# Patient Record
Sex: FEMALE | Race: ASIAN | NOT HISPANIC OR LATINO | Employment: UNEMPLOYED | URBAN - METROPOLITAN AREA
[De-identification: names, ages, dates, MRNs, and addresses within clinical notes are randomized per-mention and may not be internally consistent; named-entity substitution may affect disease eponyms.]

---

## 2017-05-03 ENCOUNTER — OFFICE VISIT (OUTPATIENT)
Dept: FAMILY MEDICINE | Facility: CLINIC | Age: 72
End: 2017-05-03

## 2017-05-03 VITALS
OXYGEN SATURATION: 99 % | WEIGHT: 117 LBS | HEART RATE: 71 BPM | TEMPERATURE: 97.6 F | HEIGHT: 56 IN | SYSTOLIC BLOOD PRESSURE: 129 MMHG | BODY MASS INDEX: 26.32 KG/M2 | DIASTOLIC BLOOD PRESSURE: 84 MMHG

## 2017-05-03 DIAGNOSIS — N18.4 CKD (CHRONIC KIDNEY DISEASE) STAGE 4, GFR 15-29 ML/MIN (H): Primary | ICD-10-CM

## 2017-05-03 DIAGNOSIS — Z12.11 COLON CANCER SCREENING: ICD-10-CM

## 2017-05-03 DIAGNOSIS — M81.0 OSTEOPOROSIS: ICD-10-CM

## 2017-05-03 NOTE — NURSING NOTE
name: Edmund-Her  Language: Hmong  Agency: [employer]  Phone number: [phone]    PHQ9--given to pt to have  help fill out  Fit test--given to pt daughter with instructions of how to obtain specimen and returning it back to clinic

## 2017-05-03 NOTE — PATIENT INSTRUCTIONS
Only use tylenol for pain (ibuprofen, alleve, motrin can damage kidney).  Make an appointment at clinic when knee and ankle pain returns.  Come back to clinic in 2 months for recheck and to discuss Honoring Choices form.

## 2017-05-03 NOTE — MR AVS SNAPSHOT
"              After Visit Summary   5/3/2017    Ralph Marion    MRN: [id number]           Patient Information     Date Of Birth          1945        Visit Information        Provider Department      5/3/2017 2:40 PM Kortney Clemons MD [facility]        Today's Diagnoses     Screening    -  1      Care Instructions    Only use tylenol for pain (ibuprofen, alleve, motrin can damage kidney).  Make an appointment at clinic when knee and ankle pain returns.  Come back to clinic in 2 months for recheck and to discuss Honoring Choices form.        Follow-ups after your visit        Future tests that were ordered for you today     Open Future Orders        Priority Expected Expires Ordered    Fecal Occult Blood - FIT, iFOB ([facility]) Routine  5/10/2017 5/3/2017            Who to contact     Please call your clinic at [phone] to:    Ask questions about your health    Make or cancel appointments    Discuss your medicines    Learn about your test results    Speak to your doctor   If you have compliments or concerns about an experience at your clinic, or if you wish to file a complaint, please contact [facility] Patient Relations at [phone] or email us at [email or web address]         Additional Information About Your Visit        Care EveryWhere ID     This is your Care EveryWhere ID. This could be used by other organizations to access your [facility] medical records  [id number]        Your Vitals Were     Pulse Temperature Height Pulse Oximetry BMI (Body Mass Index)       71 97.6  F (36.4  C) (Oral) 4' 7.5\" (141 cm) 99% 26.71 kg/m2        Blood Pressure from Last 3 Encounters:   05/03/17 129/84   09/16/16 132/81   08/25/16 130/72    Weight from Last 3 Encounters:   05/03/17 117 lb (53.1 kg)   09/16/16 121 lb 6.4 oz (55.1 kg)   08/25/16 121 lb (54.9 kg)               Primary Care Provider Office Phone # Fax #    Kortney Clemons [phone] "       [facility] [address] [place] [id number]        Thank you!     Thank you for choosing [facility]  for your care. Our goal is always to provide you with excellent care. Hearing back from our patients is one way we can continue to improve our services. Please take a few minutes to complete the written survey that you may receive in the mail after your visit with us. Thank you!             Your Updated Medication List - Protect others around you: Learn how to safely use, store and throw away your medicines at www.disposemymeds.org.          This list is accurate as of: 5/3/17  4:00 PM.  Always use your most recent med list.                   Brand Name Dispense Instructions for use    Incontinence Brief Medium Misc     100 Bottle    Please use as needed for urinary incontinence.

## 2017-05-04 DIAGNOSIS — Z13.9 SCREENING: ICD-10-CM

## 2017-05-04 LAB — HEMOCCULT STL QL IA: NEGATIVE

## 2017-05-04 NOTE — PROGRESS NOTES
HPI:       Ralph Marion is a 72 year old female with a hx of osteoporosis, CKD stage 4 and depression who presents for follow up of concern(s) listed below    CKD stage 4:  -referred to nephrology who recommended kidney biopsy and eventual dialysis - pt refusing both of these  -pt is scheduled to see nephrology again on 5/16 - had labs done 5/2  -reports nephrology tried to start pt on lisinopril (likely for kidney protection) but pt developed cough so she stopped this  -denies chest pain, palpitations, SOB, confusion, LE edema, abd pain    Osteoporosis:  -hx osteoporosis on DEXA scan  -stopped taking vitamin D and calcium because it was making her feel ill  -not taking bisphosphonate - limited options given kidney disease; could use Forteo - nephrology was fine with using this but pt not wanting to take injectable medication  -no recent falls    Goals of care:  -daughter reports that pt has talked with family regarding her goals of care - does not want resuscitation, no dialysis (patient confirms this today)  -discussed that pt should complete paperwork regarding these wishes so healthcare professionals know patient's goals in case pt/family unable to communicate    Handicap parking form:  -pt here with handicap parking form today because of intermittent left knee pain  -she states she will get flares of pain once every couple months, when these happen she has difficulty ambulating and uses cane  -she mentions wondering if this is from gout but has never been tested for this      A BodBot  was used for this visit         PMHX:     Patient Active Problem List   Diagnosis     Allergies     Arthritis     Chronic pain     Hypercholesterolemia     Secondary localized osteoarthrosis, shoulder region     Recurrent major depression in full remission     Disorder of kidney and ureter     Urinary incontinence     Vitamin deficiency     Personal care impairment     Osteoporosis     CKD (chronic kidney disease)  "stage 4, GFR 15-29 ml/min (H)     Current Outpatient Prescriptions   Medication Sig Dispense Refill     Incontinence Supply Disposable (INCONTINENCE BRIEF MEDIUM) MISC Please use as needed for urinary incontinence. 100 Bottle 1     Allergies   Allergen Reactions     Lisinopril Cough     No Known Drug Allergy             Review of Systems:   ROS negative except as noted above          Physical Exam:     Vitals:    05/03/17 1502   BP: 129/84   Pulse: 71   Temp: 97.6  F (36.4  C)   TempSrc: Oral   SpO2: 99%   Weight: 117 lb (53.1 kg)   Height: 4' 7.5\" (141 cm)     Body mass index is 26.71 kg/(m^2).    General appearance: alert, NAD, accompanied by daughter  HEENT: atraumatic, normocephalic, no scleral icterus or injection, moist mucous membranes  CV: RRR, no murmurs/rubs/gallops, normal S1 and S2  Lungs: CTAB, no wheezes or crackles, breathing comfortably on room air  Extremities: no LE edema bilaterally, moving all extremities  Skin: no rashes or lesions  Neuro: alert, oriented x3, CNs grossly intact, no focal deficits appreciated  Psych: normal mood/affect/behavior, answering questions appropriately via     Assessment and Plan     CKD (chronic kidney disease) stage 4, GFR 15-29 ml/min: pt refusing kidney biopsy and dialysis. Discussed progression of kidney disease today and that this is a fatal disease. Pt voices understanding. Will attempt to medically manage as able. Plan to seen nephrology in 2 weeks - pt reports that will likely be her last visit with nephrology as she does not like going to those visits.    Osteoporosis: dx on DEXA. Pt stopped calcium and vitamin D supplementation; Forteo only option for bisphosphonate and pt unwilling to take injectable medication. Discussed pt at high risk for fracture if has a fall - discussed fall prevention strategies.     Goals of Care: given Honoring Choices form today; pt to complete with family (son speaks/reads English). Will bring at next visit so copies " can be made.    Colon cancer screening: pt agrees to FIT test  - Fecal Occult Blood - FIT, iFOB ([facility]); Future    Hx intermittent left knee pain: unclear etiology at this time. Discussed that I cannot fill out handicap parking form without a diagnosis. Recommend pt be seen in clinic during next flare of pain - will evaluate and likely work up for gout.    Options for treatment and follow-up care were reviewed with the patient and/or guardian. Ralph Sanam and/or guardian engaged in the decision making process and verbalized understanding of the options discussed and agreed with the final plan.    Kortney Clemons MD      Precepted today with: Rebecca Ho MD

## 2017-05-05 ASSESSMENT — PATIENT HEALTH QUESTIONNAIRE - PHQ9: SUM OF ALL RESPONSES TO PHQ QUESTIONS 1-9: 3

## 2017-05-16 ENCOUNTER — TRANSFERRED RECORDS (OUTPATIENT)
Dept: HEALTH INFORMATION MANAGEMENT | Facility: CLINIC | Age: 72
End: 2017-05-16

## 2017-05-16 NOTE — PROGRESS NOTES
Preceptor Attestation:  Patient's case reviewed and discussed with Kortney Clemons MD.   Patient seen and discussed with the resident.  I agree with assessment and plan of care.  Supervising Physician:  Rebecca Ho MD  [facility]

## 2017-07-19 ENCOUNTER — TRANSFERRED RECORDS (OUTPATIENT)
Dept: HEALTH INFORMATION MANAGEMENT | Facility: CLINIC | Age: 72
End: 2017-07-19

## 2017-08-07 ENCOUNTER — TRANSFERRED RECORDS (OUTPATIENT)
Dept: HEALTH INFORMATION MANAGEMENT | Facility: CLINIC | Age: 72
End: 2017-08-07

## 2017-08-11 PROBLEM — I10 BENIGN ESSENTIAL HYPERTENSION: Status: ACTIVE | Noted: 2017-08-11

## 2017-09-08 ENCOUNTER — MEDICAL CORRESPONDENCE (OUTPATIENT)
Dept: HEALTH INFORMATION MANAGEMENT | Facility: CLINIC | Age: 72
End: 2017-09-08

## 2017-11-01 ENCOUNTER — MEDICAL CORRESPONDENCE (OUTPATIENT)
Dept: HEALTH INFORMATION MANAGEMENT | Facility: CLINIC | Age: 72
End: 2017-11-01

## 2017-11-06 ENCOUNTER — TRANSFERRED RECORDS (OUTPATIENT)
Dept: HEALTH INFORMATION MANAGEMENT | Facility: CLINIC | Age: 72
End: 2017-11-06

## 2017-11-14 ENCOUNTER — DOCUMENTATION ONLY (OUTPATIENT)
Dept: FAMILY MEDICINE | Facility: CLINIC | Age: 72
End: 2017-11-14

## 2017-11-15 NOTE — PROGRESS NOTES
Annual Review completed with client. Client is her own decision maker.  Health risk assessment completed per health plan protocol. [facility]. Attendance:  Client, Pa, ERNESTO BEATTY, CM and Nubia, .     Living Environment:  Client living in a one bedroom apartment on the ground level.  She is liking her new environment.  She has a front door that leads to the backyard that she can sit outside. She has family in the area that help out frequently.  Her son assists with the finances and MA paperwork.  Products are delivered to her son s home.       Disease/Medical Concerns:   Client reports that her health is fair. Client reports that she still suffers from left arm weakness where she can't lift or carry anything.  This is from a fall she had 10 years ago.  She has stage 5 CKD and does not want dialysis or biopsy.  Family appears to be fine with her not pursuing treatment   Family is going along with her wishes.  Per EMR kidney doctor recommended two meds, Almlodipine which dtr says she is taking.  She stopped the sodium bicarbonate because it is making her dizzy.  Client has a follow up with kidney specialist later this month. Client reports the doctor said with her age she no longer needs a mammogram.  She had a colonoscopy on 05/04/17.  She reports no falls or hospitalizations.  She refuses a flu shot because she became ill last year.        Function:  Client meets [facility] because she is dependent in grooming and bathing.  She lives alone and would be homeless without her current service plan.  Client reports due to age and chronic pain/left shoulder pain her children manage the cleaning, cooking, laundry and shopping.  With her left arm weakness she is unable to carry any items.  Client has PCA and homemaking hours.  Client gets monthly pull ups.  Her medium pull ups are working out nicely. She is requesting the bus pass to stop.  She does not feel up to taking the  bus any more.  She has family that can transport her around. She is [place] and has no interest in getting a hearing test.  She just wants people to speak loudly. She had an eye exam in September.  She has denture and since having no issues does not plan to see a dentist.      Mental Status/Cognition:  Client is alert and oriented.  She is able to express her needs.  She lives alone and gets lonely.  She feels going to the ADL allows her to be around other woman her age for socialization.  She attends adult day care.  She enjoys the activities of playing cards, using exercise machine, tossing the ball and playing bingo.      Care Plan:  reviewed and signed plan of care  Supportive Home Health Care:  PCA/1.25 hrs a day, Homemaking/5 hours a week  [facility]: Adult day care and transportation/two day a week  Contact client every six months or sooner if needed

## 2017-11-24 ENCOUNTER — OFFICE VISIT (OUTPATIENT)
Dept: FAMILY MEDICINE | Facility: CLINIC | Age: 72
End: 2017-11-24

## 2017-11-24 VITALS
TEMPERATURE: 97.8 F | SYSTOLIC BLOOD PRESSURE: 147 MMHG | OXYGEN SATURATION: 100 % | BODY MASS INDEX: 28.05 KG/M2 | DIASTOLIC BLOOD PRESSURE: 81 MMHG | HEART RATE: 67 BPM | HEIGHT: 55 IN | WEIGHT: 121.2 LBS

## 2017-11-24 DIAGNOSIS — Z20.1 EXPOSURE TO TB: Primary | ICD-10-CM

## 2017-11-24 RX ORDER — SODIUM BICARBONATE 650 MG/1
650 TABLET ORAL 4 TIMES DAILY
COMMUNITY
Start: 2017-11-24 | End: 2019-06-11

## 2017-11-24 NOTE — MR AVS SNAPSHOT
"              After Visit Summary   11/24/2017    Ralph Marion    MRN: [id number]           Patient Information     Date Of Birth          1945        Visit Information        Provider Department      11/24/2017 3:40 PM Ginette Colorado, DO [facility]        Today's Diagnoses     Exposure to TB    -  1       Follow-ups after your visit        Who to contact     Please call your clinic at [phone] to:    Ask questions about your health    Make or cancel appointments    Discuss your medicines    Learn about your test results    Speak to your doctor   If you have compliments or concerns about an experience at your clinic, or if you wish to file a complaint, please contact [facility] Patient Relations at [phone] or email us at [email or web address]         Additional Information About Your Visit        Care EveryWhere ID     This is your Care EveryWhere ID. This could be used by other organizations to access your [place] medical records  [id number]        Your Vitals Were     Pulse Temperature Height Pulse Oximetry BMI (Body Mass Index)       67 97.8  F (36.6  C) (Oral) 4' 7.25\" (140.3 cm) 100% 27.92 kg/m2        Blood Pressure from Last 3 Encounters:   11/24/17 147/81   05/03/17 129/84   09/16/16 132/81    Weight from Last 3 Encounters:   11/24/17 121 lb 3.2 oz (55 kg)   05/03/17 117 lb (53.1 kg)   09/16/16 121 lb 6.4 oz (55.1 kg)              Today, you had the following     No orders found for display       Primary Care Provider Office Phone # Fax #    Kortney Clemons [phone]       [facility] [address] [place] [address]        Equal Access to Services     BABATUNDE MOLINA AH: Hadii monika Kelly, waivetteda luqadaha, qaybta kaalmada adeegyada, reina fiore. So [facility] [phone].    ATENCIÓN: Si habla español, tiene a leyva disposición servicios gratuitos de asistencia " lingüísticaMeaghan Jim al [phone].    We comply with applicable federal civil rights laws and [place] laws. We do not discriminate on the basis of race, color, national origin, age, disability, sex, sexual orientation, or gender identity.            Thank you!     Thank you for choosing [facility]  for your care. Our goal is always to provide you with excellent care. Hearing back from our patients is one way we can continue to improve our services. Please take a few minutes to complete the written survey that you may receive in the mail after your visit with us. Thank you!             Your Updated Medication List - Protect others around you: Learn how to safely use, store and throw away your medicines at www.disposemymeds.org.          This list is accurate as of: 11/24/17 11:59 PM.  Always use your most recent med list.                   Brand Name Dispense Instructions for use Diagnosis    Incontinence Brief Medium Misc     100 Bottle    Please use as needed for urinary incontinence.    Urinary incontinence       sodium bicarbonate 650 MG tablet      Take 1 tablet (650 mg) by mouth 4 times daily

## 2017-11-24 NOTE — PROGRESS NOTES
[facility] - Family Medicine    Ralph Marion is a 72 year old  female with past medical history significant of CKD4 and osteoporosis presenting for chief complaint of:     Patient presents with:  Body Fluid Exposure: possible exposure to TB a year ago, tested negative in July  Medication Reconciliation: pt is taking sodium bicarbinate      SUBJECTIVE:        HPI:  Here today due to concerns of TB exposure. Her PCA wanted her to be tested for TB. Just wants to take precautions and make sure the elderly are not spreading TB to each other. No known new exposures. Is not with a new PCA service, has been going there 10 years.     She has been going to the day center 2 times per week.     ROS: no unexplained weight loss, no fevers, no chills, no night sweats, no cough, no hemoptysis    History provided by Ralph  Interpretor: [facility]    ROS:   See HPI above.     HISTORY:     Past Medical History:   Diagnosis Date     Allergies 1/2/2013     Arthritis 1/2/2013     Chronic pain 1/2/2013     CKD (chronic kidney disease) stage 4, GFR 15-29 ml/min (H) 8/26/2016     Contact dermatitis 1/2/2013     Hypercholesterolemia 1/2/2013     Secondary localized osteoarthrosis, shoulder region 1/2/2013     Urinary incontinence 1/2/2013     Vitamin deficiency 1/2/2013       Past Surgical History:   Procedure Laterality Date     NO HISTORY OF SURGERY            Allergies   Allergen Reactions     Lisinopril Cough     No Known Drug Allergy        Current Outpatient Prescriptions   Medication     Incontinence Supply Disposable (INCONTINENCE BRIEF MEDIUM) MISC     No current facility-administered medications for this visit.        Family History   Problem Relation Age of Onset     CEREBROVASCULAR DISEASE Brother      Hypertension No family hx of      DIABETES No family hx of        Social History     Social History     Marital status:      Spouse name: N/A     Number of children: N/A     Years of education: N/A     Occupational History  "    Not on file.     Social History Main Topics     Smoking status: Never Smoker     Smokeless tobacco: Not on file     Alcohol use No     Drug use: No     Sexual activity: Not Currently     Other Topics Concern     Not on file     Social History Narrative    Retired. .        OBJECTIVE:      PHYSICAL EXAM:  VITALS: /81 (BP Location: Right arm, Patient Position: Chair, Cuff Size: Adult Regular)  Pulse 67  Temp 97.8  F (36.6  C) (Oral)  Ht 4' 7.25\" (140.3 cm)  Wt 121 lb 3.2 oz (55 kg)  SpO2 100%  BMI 27.92 kg/m2    GENERAL: [place] looks stated age, in no acute distress, pleasant, wearing mask   HEENT: sclera white,   HEART: regular rate and rhythm, no murmurs   LUNGS: clear to auscultation bilaterally, unlabored     LABS:  Quantiferon Gold from July 2017: negative        ASSESSMENT & PLAN:      Ralph Marion is a 72 year old  female who presented for TB testing.     1. Exposure to TB  We reviewed her exposure history. No known new exposures, but interacts with high risk population. Does not have any symptoms of active TB.     Offered 2 choices, retest today or take copy of negative lab result from July to PCA. Opted to take a copy of old lab.     Encouraged to return to clinic for TB testing if new known exposure.     Reasonable to consider annual testing.     Options for treatment and follow-up care were reviewed with the patient and/or guardian. Ralph Marion and/or guardian engaged in the decision making process and verbalized understanding of the options discussed and agreed with the final plan.    Precepted today with: Dr. Carmen Colorado,    [facility] Resident, PGY-3    "

## 2017-11-28 NOTE — PROGRESS NOTES
Preceptor Attestation:  Patient's case reviewed and discussed with Ginette Colorado, DO Patient seen and discussed with the resident. I agree with assessment and plan of care.  Supervising Physician:  Carmen Vasquez DO  [facility]

## 2018-04-13 DIAGNOSIS — Z12.11 SCREEN FOR COLON CANCER: Primary | ICD-10-CM

## 2018-04-19 DIAGNOSIS — Z12.11 SCREEN FOR COLON CANCER: ICD-10-CM

## 2018-04-19 LAB — HEMOCCULT STL QL IA: NEGATIVE

## 2018-04-20 NOTE — PROGRESS NOTES
Unable to get a hold of patient via telephone. Results mailed to patient.   ~ Ortiz LOCKWOOD CMA ([place])

## 2018-10-04 ENCOUNTER — DOCUMENTATION ONLY (OUTPATIENT)
Dept: FAMILY MEDICINE | Facility: CLINIC | Age: 73
End: 2018-10-04

## 2018-10-16 NOTE — LETTER
April 20, 2018      Ralph Marion  [address]  [place] [address]        Dear [place],    Please see below for your test results. We have been unable to reach you by phone.       Your stool test was negative for blood - this is great news. We will do this test again in 1 year to screen for colon cancer.      Resulted Orders   Fecal Occult Blood - FIT, iFOB ([facility])   Result Value Ref Range    Occult Blood Scn FIT NEGATIVE Negative       If you have any questions, please call the clinic to make an appointment.      Sincerely,    Dr. Clemons   Number Of Group Ii Special Stains Used ([id number]): None

## 2018-11-01 ENCOUNTER — MEDICAL CORRESPONDENCE (OUTPATIENT)
Dept: HEALTH INFORMATION MANAGEMENT | Facility: CLINIC | Age: 73
End: 2018-11-01

## 2018-11-02 NOTE — PROGRESS NOTES
Visit to the client's home for annual health risk assessment.  An  was present.    Current situation/living environment  Client lives alone in a one bedroom apartment.  She has an active family that assist as needed.  Her son helps with [place] paperwork and finances.     Activities of daily living (ADL)/instrumental activities of daily living (IADL) and functional issues  Client needs help with the following ADL's: grooming and bathing  Client needs help with the following IADL's: shopping, cooking, housekeeping, laundry, managing finances/bills and transportation  Client states she is unable to perform the above due to Chronic pain and difficulty using her left arm.        Health concerns for today  Client has no health concerns at this time.  Discussed her CKD and still does not want dialysis.  Ralph reports that her body does not feel like there is anything wrong so she sees no reason to get dialysis.  She says she has one medication from the kidney doctor and is taking it as prescribed.  No hospitalizations reported. She reports that she checks her BP at [facility].  She refuses flu shot because she became ill last time she had it.  She had a hearing test and bilateral hearing aids were suggested. Ralph is declining at this time because others tell her they do not fit right in the ears.  Plus she says if people will speak loudly she has no issues.  Discussed wellness visit since it has been a year.      Has patient fallen 2 or more times in the last year? No  Has patient fallen with injury in the last year? No    Cognition/mental health  Client is alert and oriented. Ralph discusses that she enjoys her time at [facility].  She has met friends and enjoys getting out of her home and being around others.  She just wants to remain going one time a week.  Her DIL had a stroke and is in the hospital at this time.  Unsure what her prognosis is but it does not look good.  She is very concerned and she has been spending time at the  \[facility]".  Her granddaughter has been coming over to help her while son is busy with his wife.     STARS/Med Adherence  Client is above the age for quality measures at this time.   Client's Plan of Care consists of:  Adult day care (1 days per week), Homemaker services (5 hours per week), Personal care assistance (1.25 hours per day)  and Specialized supplies and equipment (incontinent products)

## 2018-11-19 ENCOUNTER — OFFICE VISIT (OUTPATIENT)
Dept: FAMILY MEDICINE | Facility: CLINIC | Age: 73
End: 2018-11-19
Payer: COMMERCIAL

## 2018-11-19 VITALS
WEIGHT: 123.2 LBS | DIASTOLIC BLOOD PRESSURE: 87 MMHG | HEIGHT: 56 IN | BODY MASS INDEX: 27.71 KG/M2 | SYSTOLIC BLOOD PRESSURE: 148 MMHG | RESPIRATION RATE: 20 BRPM | OXYGEN SATURATION: 97 % | HEART RATE: 67 BPM | TEMPERATURE: 98.5 F

## 2018-11-19 DIAGNOSIS — Z00.00 MEDICARE ANNUAL WELLNESS VISIT, SUBSEQUENT: Primary | ICD-10-CM

## 2018-11-19 LAB
ALBUMIN SERPL-MCNC: 3.1 G/DL (ref 3.2–4.5)
ALP SERPL-CCNC: 126 U/L (ref 40–150)
ALT SERPL-CCNC: 18 U/L (ref 0–45)
AST SERPL-CCNC: 27 U/L (ref 0–45)
BILIRUB SERPL-MCNC: 0.5 MG/DL (ref 0.2–1.3)
BUN SERPL-MCNC: 68 MG/DL (ref 7–30)
CALCIUM SERPL-MCNC: 8.6 MG/DL (ref 8.5–10.4)
CHLORIDE SERPLBLD-SCNC: 109 MMOL/L (ref 94–109)
CO2 SERPL-SCNC: 26 MMOL/L (ref 20–32)
CREAT SERPL-MCNC: 3.8 MG/DL (ref 0.6–1.3)
EGFR CALCULATED (BLACK REFERENCE): 14.9 ML/MIN
EGFR CALCULATED (NON BLACK REFERENCE): 12.3 ML/MIN
GLUCOSE SERPL-MCNC: 110 MG/DL (ref 60–109)
HCT VFR BLD AUTO: 35.2 % (ref 35–47)
HEMOGLOBIN: 10.7 G/DL (ref 11.7–15.7)
MCH RBC QN AUTO: 28.3 PG (ref 26.5–35)
MCHC RBC AUTO-ENTMCNC: 30.4 G/DL (ref 32–36)
MCV RBC AUTO: 93.1 FL (ref 78–100)
PLATELET # BLD AUTO: 194 K/UL (ref 150–450)
POTASSIUM SERPL-SCNC: 4.6 MMOL/L (ref 3.4–5.3)
PROT SERPL-MCNC: 7.2 G/DL (ref 6.6–8.8)
RBC # BLD AUTO: 3.78 M/UL (ref 3.8–5.2)
SODIUM SERPL-SCNC: 148 MMOL/L (ref 133–144)
WBC # BLD AUTO: 6.6 K/UL (ref 4–11)

## 2018-11-19 NOTE — PATIENT INSTRUCTIONS
Preventive Health Recommendations    See your health care provider every year to    Review health changes.     Discuss preventive care.      Review your medicines if your doctor has prescribed any.      You no longer need a yearly Pap test unless you've had an abnormal Pap test in the past 10 years. If you have vaginal symptoms, such as bleeding or discharge, be sure to talk with your provider about a Pap test.      Every 1 to 2 years, have a mammogram.  If you are over 69, talk with your health care provider about whether or not you want to continue having screening mammograms.      Every 10 years, have a colonoscopy. Or, have a yearly FIT test (stool test). These exams will check for colon cancer.       Have a cholesterol test every 5 years, or more often if your doctor advises it.       Have a diabetes test (fasting glucose) every three years. If you are at risk for diabetes, you should have this test more often.       At age 65, have a bone density scan (DEXA) to check for osteoporosis (brittle bone disease).    Shots:    Get a flu shot each year.    Get a tetanus shot every 10 years.    Talk to your doctor about your pneumonia vaccines. There are now two you should receive - Pneumovax (PPSV 23) and Prevnar (PCV 13).    Talk to your pharmacist about the shingles vaccine.    Talk to your doctor about the hepatitis B vaccine.    Nutrition:     Eat at least 5 servings of fruits and vegetables each day.      Eat whole-grain bread, whole-wheat pasta and brown rice instead of white grains and rice.      Get adequate Calcium and Vitamin D.     Lifestyle    Exercise at least 150 minutes a week (30 minutes a day, 5 days a week). This will help you control your weight and prevent disease.      Limit alcohol to one drink per day.      No smoking.       Wear sunscreen to prevent skin cancer.       See your dentist twice a year for an exam and cleaning.      See your eye doctor every 1 to 2 years to screen for conditions  such as glaucoma, macular degeneration and cataracts.    Personalized Prevention Plan  You are due for the preventive services outlined below.  Your care team is available to assist you in scheduling these services.  If you have already completed any of these items, please share that information with your care team to update in your medical record.  Health Maintenance Due   Topic Date Due     Wellness Visit with your Primary Provider - yearly  1945     LESLIE QUESTIONNAIRE 1 YEAR  [birth year]/06/1963     Discuss Advance Directive Planning  01/06/1998     Pneumococcal Vaccine (1 of 2 - PCV13) 01/06/2008     Depression Assessment - every 6 months  11/03/2017     Tetanus Vaccine - every 10 years  05/07/2018     Flu Vaccine (1) 09/01/2018

## 2018-11-19 NOTE — LETTER
December 21, 2018      Ralph Sanam  [address]  [place] [address]        Dear Pinckney,    Your kidney function from the blood we took in November was a little worse compared to previous. This could be from dehydration or many causes. Continue to follow with nephrology as scheduled and we will continue to monitor this lab.   Your Hemoglobin was again a trace low, but it is the same as it was two years ago.     Please see below for your test results.    Resulted Orders   Comprehensive Metabolic Panel ([facility]) - results <1hr Protocol   Result Value Ref Range    Glucose 110.0 (H) 60.0 - 109.0 mg/dL    Urea Nitrogen 68.0 (H) 7.0 - 30.0 mg/dL    Creatinine 3.8 (H) 0.6 - 1.3 mg/dL    Sodium 148.0 (H) 133.0 - 144.0 mmol/L    Potassium 4.6 3.4 - 5.3 mmol/L    Chloride 109.0 94.0 - 109.0 mmol/L    Carbon Dioxide 26.0 20.0 - 32.0 mmol/L    Calcium 8.6 8.5 - 10.4 mg/dL    Protein Total 7.2 6.6 - 8.8 g/dL    Albumin 3.1 (L) 3.2 - 4.5 g/dL    Alkaline Phosphatase 126.0 40.0 - 150.0 U/L    ALT 18.0 0.0 - 45.0 U/L    AST 27.0 0.0 - 45.0 U/L    Bilirubin Total 0.5 0.2 - 1.3 mg/dL    eGFR Calculated (Non Black Reference) 12.3 (L) >60.0 mL/min    eGFR Calculated (Black Reference) 14.9 (L) >60.0 mL/min   CBC with Plt ([facility])   Result Value Ref Range    WBC 6.6 4.0 - 11.0 K/uL    RBC 3.78 (L) 3.80 - 5.20 M/uL    Hemoglobin 10.7 (L) 11.7 - 15.7 g/dL    Hematocrit 35.2 35.0 - 47.0 %    MCV 93.1 78.0 - 100.0 fL    MCH 28.3 26.5 - 35.0 pg    MCHC 30.4 (L) 32.0 - 36.0 g/dL    Platelets 194.0 150.0 - 450.0 K/uL       If you have any questions, please call the clinic to make an appointment.    Sincerely,    Oliver Powell MD

## 2018-11-19 NOTE — PROGRESS NOTES
Annual Wellness Visit for 65 years and older    HPI  This 73 year old female presents as an established patient who presents for an annual wellness visit.    Other issues patient wants to be addressed today:  Chief Complaint   Patient presents with     Wellness Visit     Wellness     Medication Reconciliation     Completed       Patient Active Problem List   Diagnosis     Allergies     Arthritis     Chronic pain     Hypercholesterolemia     Secondary localized osteoarthrosis, shoulder region     Recurrent major depression in full remission     Disorder of kidney and ureter     Urinary incontinence     Vitamin deficiency     Personal care impairment     Osteoporosis     CKD (chronic kidney disease) stage 5, GFR less than 15 ml/min (H)     Benign essential hypertension     Past Medical History:   Diagnosis Date     Allergies 1/2/2013     Arthritis 1/2/2013     Chronic pain 1/2/2013     CKD (chronic kidney disease) stage 4, GFR 15-29 ml/min (H) 8/26/2016     Contact dermatitis 1/2/2013     Hypercholesterolemia 1/2/2013     Secondary localized osteoarthrosis, shoulder region 1/2/2013     Urinary incontinence 1/2/2013     Vitamin deficiency 1/2/2013       Family History   Problem Relation Age of Onset     Cerebrovascular Disease Brother      Hypertension No family hx of      Diabetes No family hx of      Problem List, Family History and past Medical History reviewed and  Unchanged/updated.    Per nursing note on 10/4/18 by home health:    Current situation/living environment  Client lives alone in a one bedroom apartment.  She has an active family that assist as needed.  Her son helps with MA paperwork and finances.      Activities of daily living (ADL)/instrumental activities of daily living (IADL) and functional issues  Client needs help with the following ADL's: grooming and bathing  Client needs help with the following IADL's: shopping, cooking, housekeeping, laundry, managing finances/bills and  transportation  Client states she is unable to perform the above due to Chronic pain and difficulty using her left arm.          Health concerns for today  Client has no health concerns at this time.  Discussed her CKD and still does not want dialysis.  Ralph reports that her body does not feel like there is anything wrong so she sees no reason to get dialysis.  She says she has one medication from the kidney doctor and is taking it as prescribed.  No hospitalizations reported. She reports that she checks her BP at [facility].  She refuses flu shot because she became ill last time she had it.  She had a hearing test and bilateral hearing aids were suggested. Ralph is declining at this time because others tell her they do not fit right in the ears.  Plus she says if people will speak loudly she has no issues.  Discussed wellness visit since it has been a year.       Has patient fallen 2 or more times in the last year? No  Has patient fallen with injury in the last year? No     Cognition/mental health  Client is alert and oriented. Ralph discusses that she enjoys her time at [facility].  She has met friends and enjoys getting out of her home and being around others.  She just wants to remain going one time a week.  Her DIL had a stroke and is in the hospital at this time.  Unsure what her prognosis is but it does not look good.  She is very concerned and she has been spending time at the hospital.  Her granddaughter has been coming over to help her while son is busy with his wife.      STARS/Med Adherence  Client is above the age for quality measures at this time.   Client's Plan of Care consists of:  Adult day care (1 days per week), Homemaker services (5 hours per week), Personal care assistance (1.25 hours per day)  and Specialized supplies and equipment (incontinent products)    There are no exam notes on file for this visit.    Are you sexually active?  No     FOR WOMEN  What year did you stop having periods? 1990  Any vaginal  bleeding in the last year? No  Have you ever had an abnormal Pap smear? No    Frailty Assessment    1. Weight loss (>5% in year)  No  Wt Readings from Last 5 Encounters:   11/19/18 123 lb 3.2 oz (55.9 kg)   11/24/17 121 lb 3.2 oz (55 kg)   05/03/17 117 lb (53.1 kg)   09/16/16 121 lb 6.4 oz (55.1 kg)   08/25/16 121 lb (54.9 kg)       2. Exhaustion (perceived effort for a given activity)   How difficult is walking from one room to the other on the same level?mildly, uses cane but able     3. Weakness (handgrip strength)   How difficult is lifting or carrying something as heavy as 10 pounds? Moderately, Cannot lift more then 5lbs    4. Decreased physical activity    Compared with most (men/women) your age, would you say  that you are more active, less active, or about the same? more    5. Slow gait speed (timed up and go > 12 sec.)  Yes  FALL RISK ASSESSMENT 11/2/2018   Fallen 2 or more times in the past year? No   Any fall with injury in the past year? No       Frailty screen score: 2    Frail Assessment:1-2 Prefrail      EVALUATION OF COGNITIVE FUNCTION  Mood/affect:Normal  Appearance:Normal  Family member/caregiver input: Normal    PHQ-2 Score:   PHQ-2 ( 1999 Pfizer) 8/25/2016 6/9/2015   Q1: Little interest or pleasure in doing things 0 0   Q2: Feeling down, depressed or hopeless 0 0   PHQ-2 Score 0 0       PHQ-9 Score:   No flowsheet data found.    Mini Cog Test:    Recall result:  2 points   Clock Draw Test result: Abnormal: unable to draw clock or correct time    Cognitive screen is:Positive    Other Assessments:  CV Risk based on Pooled Cohort Risk   The 10-year ASCVD risk score (Annhermann HARRIS Jr, et al., 2013) is: 20.3%    Values used to calculate the score:      Age: 73 years      Sex: Female      Is Non- : No      Diabetic: No      Tobacco smoker: No      Systolic Blood Pressure: 148 mmHg      Is BP treated: No      HDL Cholesterol: 45 mg/dL      Total Cholesterol: 158 mg/dL    Screening  "Lipid Level (covered every 5 years ): Date done 2016    Colon CA Screening (>50-75 ) (FITT annually or colonoscopy every 10years):  Date done 4/19/18  Result(s) negative  Dexa Scan (>65 yrs) (covered every 2 years):  Refused    ECG (if done)not performed    Corrected Visual acuity: See's optometry  Hearing evaluation if done: Not done, per nursing note would require hearing aids but patient declined.     Advance Directives: Discussed with patient and family as appropriate. Has patient completed advance directives and/or a living will? Yes, other Son has paperwork but she is unsure if he has paperwork completed. They have discussed this.     Immunization History   Administered Date(s) Administered     Influenza (IIV3) PF 09/14/2012, 10/04/2013, 09/19/2014     Pneumococcal 23 valent 11/15/2001     TD (ADULT, 7+) 11/15/2001     Tdap (Adacel,Boostrix) 05/07/2008     Reviewed Immunization Record Today  Physical Exam    Vitals: /87  Pulse 67  Temp 98.5  F (36.9  C) (Oral)  Resp 20  Ht 4' 7.91\" (142 cm)  Wt 123 lb 3.2 oz (55.9 kg)  SpO2 97%  BMI 27.71 kg/m2  BMI= Body mass index is 27.71 kg/(m^2).  EXAM:  Constitutional: healthy, alert and no distress   Cardiovascular: PMI normal. No lifts, heaves, or thrills. RRR. No murmurs, clicks gallops or rub  Respiratory: Percussion normal. Good diaphragmatic excursion. Lungs clear  Abdomen: Abdomen soft, non-tender. BS normal. No masses, organomegaly  NEURO: Gait normal. Reflexes normal and symmetric. Sensation grossly WNL.    Assessment and Plan:    Reviewed Preventive Services and Plan form with patient as specified in  Patient Instructions.  Positive findings on assessment:     Wellness visit, subsequent:  Patient due for several HCM issues, but declining these today. Due for colon cancer screening in April, 2019 (previous FIT negative), declines Dexa scan, declines further cognitive evaluation. Follows with nephrology every 6 months (they are unsure which group - " unable to see in care everywhere)  -CMP and CBC today    Options for treatment and follow-up care were reviewed with the Ralph Sanam  and/or guardian engaged in the decision making process and verbalized  understanding of the options discussed and agreed with the final plan.  Oliver Powell MD    Precepted with Dr. Reynolds

## 2018-11-19 NOTE — PROGRESS NOTES
Preceptor Attestation:   Patient seen, evaluated and discussed with the resident. I have verified the content of the note, which accurately reflects my assessment of the patient and the plan of care.  Supervising Physician:Baljeet Reynolds MD  [facility]

## 2018-12-21 NOTE — RESULT ENCOUNTER NOTE
I think I tried to call the patient, but needed an . Cleaning out my results now though so if you can call with  that would be awesome!  Ralph,   Your kidney function from the blood we took in November was a little worse compared to previous. This could be from dehydration or many causes. Continue to follow with nephrology as scheduled and we will continue to monitor this lab.   Your Hemoglobin was again a trace low, but it is the same as it was two years ago.     Call the clinic if you have any questions!  Thanks!

## 2019-04-26 ENCOUNTER — OFFICE VISIT (OUTPATIENT)
Dept: FAMILY MEDICINE | Facility: CLINIC | Age: 74
End: 2019-04-26
Payer: COMMERCIAL

## 2019-04-26 VITALS
RESPIRATION RATE: 16 BRPM | HEIGHT: 56 IN | BODY MASS INDEX: 27.67 KG/M2 | SYSTOLIC BLOOD PRESSURE: 155 MMHG | DIASTOLIC BLOOD PRESSURE: 90 MMHG | HEART RATE: 70 BPM | OXYGEN SATURATION: 97 % | TEMPERATURE: 97.4 F | WEIGHT: 123 LBS

## 2019-04-26 DIAGNOSIS — M13.172: Primary | ICD-10-CM

## 2019-04-26 LAB — URATE SERPL-MCNC: 8.7 MG/DL (ref 2–7.5)

## 2019-04-26 RX ORDER — PREDNISONE 20 MG/1
40 TABLET ORAL DAILY
Qty: 10 TABLET | Refills: 0 | Status: SHIPPED | OUTPATIENT
Start: 2019-04-26 | End: 2019-05-15

## 2019-04-26 ASSESSMENT — MIFFLIN-ST. JEOR: SCORE: 904.43

## 2019-04-26 NOTE — PROGRESS NOTES
"       HPI:     Ralph Marion is a 74 year old  female with PMH of CKD5, HTN, and arthritis who presents with left foot pain.     Rlaph Marion is accompanied by her son.    Left foot pain since last night. The area also turned red and became swollen last night. Chief complaint is \"gout flare\" on her intake paperwork but she is not sure if anyone has told her she has gout. Gout is not on her problem list and I can't see any past uric acid levels drawn.     No home treatments for the pain so far other than a Hmong herb.     No fever/chills. No injury to the area. She is able to walk but it's very painful.     Patient's son desired to interpret for the visit.          PMHX:     Patient Active Problem List   Diagnosis     Allergies     Arthritis     Chronic pain     Hypercholesterolemia     Secondary localized osteoarthrosis, shoulder region     Recurrent major depression in full remission     Disorder of kidney and ureter     Urinary incontinence     Vitamin deficiency     Personal care impairment     Osteoporosis     CKD (chronic kidney disease) stage 5, GFR less than 15 ml/min (H)     Benign essential hypertension       Current Outpatient Medications   Medication Sig Dispense Refill     sodium bicarbonate 650 MG tablet Take 1 tablet (650 mg) by mouth 4 times daily       Incontinence Supply Disposable (INCONTINENCE BRIEF MEDIUM) MISC Please use as needed for urinary incontinence. (Patient not taking: Reported on 11/19/2018) 100 Bottle 1       Social History     Tobacco Use     Smoking status: Never Smoker     Smokeless tobacco: Never Used   Substance Use Topics     Alcohol use: No     Alcohol/week: 0.0 oz     Drug use: No       Social History     Social History Narrative    Retired. .        Allergies   Allergen Reactions     Lisinopril Cough     No Known Drug Allergy        No results found for this or any previous visit (from the past 24 hour(s)).         Review of Systems:   7 point ROS negative except as noted.        " "   Physical Exam:     Vitals:    04/26/19 1527 04/26/19 1529   BP: 150/79 155/90   Pulse: 70    Resp: 16    Temp: 97.4  F (36.3  C)    TempSrc: Oral    SpO2: 97%    Weight: 55.8 kg (123 lb)    Height: 1.42 m (4' 7.91\")      Body mass index is 27.67 kg/m .    General: Alert, well-appearing pleasant female in NAD, she ambulates indepently but appears to be in pain.   HEENT: PERRL, moist oral mucus membranes  Pulm: CTA BL, no tachypnea  CV: RRR, no murmur  Ext: Warm, well perfused, 2+ BL radial pulses, no LE edema except at left 5th TMT where there is point tenderness, redness at the joint, and slight edema of the joint.   Skin: No rash on limited skin exam  Psych: Mood appropriate to visit content, full affect, rational thought content and process      Assessment and Plan     1. Monoarthritis of left foot  Monoarthritis of left foot in patient at higher than average risk for gout though no formal diagnosis of gout in past. Differential certainly includes septic joint but I think it's unlikely given she looks well, is afebrile, and has no mechanism by which the area would have become infected. Will treat for gout with prednisone since her renal function precludes other treatments. Check uric acid to help confirm diagnosis. I am not able to aspirate this particular joint to get crystals.   - Uric Acid ([facility])  - predniSONE (DELTASONE) 20 MG tablet; Take 40 mg by mouth daily for 5 days.  Dispense: 10 tablet; Refill: 0  - If not improving in 48 hr or if fever occurs at anytime, RTC or go to ED.     2. HTN  BP elevated today but is in acute pain. I recommended RTC in 1 week to recheck and follow up on foot.       There are no discontinued medications.    Options for treatment and follow-up care were reviewed with the patient and/or guardian. [place] Sanam and/or guardian engaged in the decision making process and verbalized understanding of the options discussed and agreed with the final plan.    Kirstie Fofana, " MD  [facility]   Pager: [phone]

## 2019-04-26 NOTE — LETTER
April 30, 2019      Ralph Sanam  [address]  [place] [address]        Dear Ralph,    Your uric acid level (the test we use to look for gout) was high which is what we thought it would be. This means it is very likely that the pain you were having is from gout. Once your foot pain is getting better, please come back to clinic and we can discuss the possible need for a medicine to lower your uric acid.     Please see below for your test results.    Resulted Orders   Uric Acid ([facility])   Result Value Ref Range    Uric Acid 8.7 (H) 2.0 - 7.5 mg/dL    Narrative    Test performed by:  [facility]  [address] [place] [address]       If you have any questions, please call the clinic to make an appointment.    Sincerely,    Kirstie Fofana MD

## 2019-04-29 NOTE — RESULT ENCOUNTER NOTE
Could you call the patient with the following message? Thank you!    Dear Ralph,    Your uric acid level (the test we use to look for gout) was high which is what we thought it would be. This means it is very likely that the pain you were having is from gout. Once your foot pain is getting better, please come back to clinic and we can discuss the possible need for a medicine to lower your uric acid.    Sincerely,  Dr. Kirstie Fofana

## 2019-04-30 NOTE — RESULT ENCOUNTER NOTE
Pt son return call and inform him of results and recommendation. Pt son states understanding and will rely this information to pt. --SANGEETHAer, CMA

## 2019-05-15 ENCOUNTER — OFFICE VISIT (OUTPATIENT)
Dept: FAMILY MEDICINE | Facility: CLINIC | Age: 74
End: 2019-05-15
Payer: COMMERCIAL

## 2019-05-15 VITALS
OXYGEN SATURATION: 97 % | WEIGHT: 123 LBS | BODY MASS INDEX: 28.46 KG/M2 | TEMPERATURE: 97.8 F | SYSTOLIC BLOOD PRESSURE: 131 MMHG | HEIGHT: 55 IN | HEART RATE: 78 BPM | DIASTOLIC BLOOD PRESSURE: 85 MMHG | RESPIRATION RATE: 20 BRPM

## 2019-05-15 DIAGNOSIS — M10.9 GOUT OF LEFT ANKLE, UNSPECIFIED CAUSE, UNSPECIFIED CHRONICITY: Primary | ICD-10-CM

## 2019-05-15 RX ORDER — ALLOPURINOL 100 MG/1
100 TABLET ORAL DAILY
Qty: 30 TABLET | Refills: 0 | Status: SHIPPED | OUTPATIENT
Start: 2019-05-15 | End: 2019-05-15

## 2019-05-15 RX ORDER — ALLOPURINOL 100 MG/1
50 TABLET ORAL DAILY
Qty: 30 TABLET | Refills: 0 | Status: SHIPPED | OUTPATIENT
Start: 2019-05-15 | End: 2019-06-11

## 2019-05-15 ASSESSMENT — MIFFLIN-ST. JEOR: SCORE: 891.92

## 2019-05-15 NOTE — NURSING NOTE
Due to patient being non-English speaking/uses sign language, an  was used for this visit. Only for face-to-face interpretation by an external agency, date and length of interpretation can be found on the scanned worksheet.     name: Susy  Agency: [employer]  Language: Hmong   Telephone number: [phone]  Type of interpretation: Face-to-face, spoken     Rebekah Briscoe CMA

## 2019-05-15 NOTE — PROGRESS NOTES
Preceptor Attestation:   Patient seen, evaluated and discussed with the resident. I have verified the content of the note, which accurately reflects my assessment of the patient and the plan of care.  Supervising Physician:Cristobal Lopez MD  [facility]

## 2019-05-15 NOTE — PROGRESS NOTES
Assessment and Plan     1. Gout of left ankle, unspecified cause, unspecified chronicity  Appears normal on exam - goal uric acid would be between 4-6. Was just collected on 4/26 at 8.7. Given an estimated CrCl of 11 at last visit will renally dose to half. Follow up in 3 weeks for recheck. Will draw uric acid and BMP at that visit.   - allopurinol (ZYLOPRIM) 100 MG tablet; Take 0.5 tablets (50 mg) by mouth daily  Dispense: 30 tablet; Refill: 0    2. Hx of elevated BP (normal today)  BP Readings from Last 3 Encounters:   05/15/19 131/85   04/26/19 155/90   11/19/18 148/87   Continue to follow.    Options for treatment and follow-up care were reviewed with the patient and/or guardian. Ralph Marion and/or guardian engaged in the decision making process and verbalized understanding of the options discussed and agreed with the final plan.    Oliver Powell MD   [facility] Medicine Residency  Pager #: [phone]    Precepted today with: Cristobal Lopez MD           HPI:       Ralph Marion is a 74 year old female who presents for follow up of gout and blood pressure.    She was last seen 4/26/19 (about 3 weeks ago)  Was given a steroid - completed  Her left ankle is feeling much better after the medication  Walking now without pain    She thinks she is taking bicarb for her BP  She is not checking her BP at home  No HA or changes to vision         PMHX:     Patient Active Problem List   Diagnosis     Allergies     Arthritis     Chronic pain     Hypercholesterolemia     Secondary localized osteoarthrosis, shoulder region     Recurrent major depression in full remission     Disorder of kidney and ureter     Urinary incontinence     Vitamin deficiency     Personal care impairment     Osteoporosis     CKD (chronic kidney disease) stage 5, GFR less than 15 ml/min (H)     Benign essential hypertension       Current Outpatient Medications   Medication Sig Dispense Refill     sodium bicarbonate 650 MG tablet Take 1 tablet  (650 mg) by mouth 4 times daily       Incontinence Supply Disposable (INCONTINENCE BRIEF MEDIUM) MISC Please use as needed for urinary incontinence. (Patient not taking: Reported on 11/19/2018) 100 Bottle 1       Social History     Socioeconomic History     Marital status:      Spouse name: Not on file     Number of children: Not on file     Years of education: Not on file     Highest education level: Not on file   Occupational History     Not on file   Social Needs     Financial resource strain: Not on file     Food insecurity:     Worry: Not on file     Inability: Not on file     Transportation needs:     Medical: Not on file     Non-medical: Not on file   Tobacco Use     Smoking status: Never Smoker     Smokeless tobacco: Never Used   Substance and Sexual Activity     Alcohol use: No     Alcohol/week: 0.0 oz     Drug use: No     Sexual activity: Not Currently   Lifestyle     Physical activity:     Days per week: Not on file     Minutes per session: Not on file     Stress: Not on file   Relationships     Social connections:     Talks on phone: Not on file     Gets together: Not on file     Attends [employer] service: Not on file     Active member of club or organization: Not on file     Attends meetings of clubs or organizations: Not on file     Relationship status: Not on file     Intimate partner violence:     Fear of current or ex partner: Not on file     Emotionally abused: Not on file     Physically abused: Not on file     Forced sexual activity: Not on file   Other Topics Concern     Not on file   Social History Narrative    Retired. .           Allergies   Allergen Reactions     Lisinopril Cough     No Known Drug Allergy        No results found for this or any previous visit (from the past 24 hour(s)).         Review of Systems:   C: NEGATIVE for fatigue, unexpected change in weight  E: NEGATIVE for acute vision problems or changes  R: NEGATIVE for significant cough or shortness of breath  CV:  "NEGATIVE for chest pain, palpitations or new or worsening peripheral edema  P: NEGATIVE for changes in mood or affect     Complete ROS negative unless noted above or in HPI       Physical Exam:     Vitals:    05/15/19 1356   BP: 131/85   Pulse: 78   Resp: 20   Temp: 97.8  F (36.6  C)   TempSrc: Oral   SpO2: 97%   Weight: 55.8 kg (123 lb)   Height: 1.4 m (4' 7.12\")     Body mass index is 28.47 kg/m .    GENERAL APPEARANCE: alert and no acute distress  PSYCH: mentation appears normal and affect normal/bright  RESP: Breathing comfortable on room air  CV: extremities well perfused  EXT: no cyanosis or edema in lower extremities, no erythema, warmth, or swelling in L ankle, full ROM  SKIN: no venous stasis changes      "

## 2019-06-11 ENCOUNTER — OFFICE VISIT (OUTPATIENT)
Dept: FAMILY MEDICINE | Facility: CLINIC | Age: 74
End: 2019-06-11
Payer: COMMERCIAL

## 2019-06-11 VITALS
HEIGHT: 55 IN | RESPIRATION RATE: 20 BRPM | WEIGHT: 122.4 LBS | OXYGEN SATURATION: 100 % | DIASTOLIC BLOOD PRESSURE: 90 MMHG | TEMPERATURE: 98.2 F | BODY MASS INDEX: 28.33 KG/M2 | HEART RATE: 56 BPM | SYSTOLIC BLOOD PRESSURE: 161 MMHG

## 2019-06-11 DIAGNOSIS — M10.9 GOUT OF LEFT ANKLE, UNSPECIFIED CAUSE, UNSPECIFIED CHRONICITY: ICD-10-CM

## 2019-06-11 DIAGNOSIS — M1A.0710 CHRONIC GOUT OF RIGHT ANKLE, UNSPECIFIED CAUSE: Primary | ICD-10-CM

## 2019-06-11 LAB
BUN SERPL-MCNC: 55 MG/DL (ref 7–30)
CALCIUM SERPL-MCNC: 8.9 MG/DL (ref 8.5–10.4)
CHLORIDE SERPLBLD-SCNC: 112 MMOL/L (ref 94–109)
CO2 SERPL-SCNC: 26 MMOL/L (ref 20–32)
CREAT SERPL-MCNC: 3.9 MG/DL (ref 0.6–1.3)
EGFR CALCULATED (BLACK REFERENCE): 14.4 ML/MIN
EGFR CALCULATED (NON BLACK REFERENCE): 11.9 ML/MIN
GLUCOSE SERPL-MCNC: 109 MG/DL (ref 60–109)
POTASSIUM SERPL-SCNC: 5.6 MMOL/L (ref 3.4–5.3)
SODIUM SERPL-SCNC: 147 MMOL/L (ref 133–144)
URATE SERPL-MCNC: 6.3 MG/DL (ref 2–7.5)

## 2019-06-11 RX ORDER — ALLOPURINOL 100 MG/1
50 TABLET ORAL DAILY
Qty: 30 TABLET | Refills: 1 | Status: SHIPPED | OUTPATIENT
Start: 2019-06-11 | End: 2021-04-30

## 2019-06-11 ASSESSMENT — MIFFLIN-ST. JEOR: SCORE: 889.2

## 2019-06-11 NOTE — NURSING NOTE
Due to patient being non-English speaking/uses sign language, an  was used for this visit. Only for face-to-face interpretation by an external agency, date and length of interpretation can be found on the scanned worksheet.     name: Roberth Perla  Agency: [employer]  Language: Reinaldo   Telephone number: [phone]  Type of interpretation: Face-to-face, spoken

## 2019-06-11 NOTE — PROGRESS NOTES
"Assessment and Plan     1. Chronic gout of right ankle, unspecified cause  No longer having flare. Hoping for uric acid goal <6. Difficult to adjust allopurinol given CKD so will recheck kidney function as well. Will continue allopurinol for prevention.   - Basic Metabolic Panel ([facility]) - Results < 1 hr  - Uric Acid ([facility])  - allopurinol (ZYLOPRIM) 100 MG tablet; Take 0.5 tablets (50 mg) by mouth daily  Dispense: 30 tablet; Refill: 1    Options for treatment and follow-up care were reviewed with the patient and/or guardian. Ralph Marion and/or guardian engaged in the decision making process and verbalized understanding of the options discussed and agreed with the final plan.    Oliver Powell MD   [facility] Residency  Pager #: [phone]    Precepted today with: Dr. Vasquez           HPI:       Ralph Marion is a 74 year old female who presents as follow up for her gout.     She completed the steroids  Since then has had no pain   \"As long as I take my medications every day I have no pain\"  She has an appointment with nephrology coming up on 6/14 for her CKD  No changes to diet  Not drinking alcohol          PMHX:     Patient Active Problem List   Diagnosis     Allergies     Arthritis     Chronic pain     Hypercholesterolemia     Secondary localized osteoarthrosis, shoulder region     Recurrent major depression in full remission     Disorder of kidney and ureter     Urinary incontinence     Vitamin deficiency     Personal care impairment     Osteoporosis     CKD (chronic kidney disease) stage 5, GFR less than 15 ml/min (H)     Benign essential hypertension       Current Outpatient Medications   Medication Sig Dispense Refill     allopurinol (ZYLOPRIM) 100 MG tablet Take 0.5 tablets (50 mg) by mouth daily 30 tablet 0     sodium bicarbonate 650 MG tablet Take 1 tablet (650 mg) by mouth 4 times daily         Social History     Tobacco Use     Smoking status: Never Smoker     Smokeless tobacco: Never " "Used   Substance Use Topics     Alcohol use: No     Alcohol/week: 0.0 oz     Drug use: No            Allergies   Allergen Reactions     Lisinopril Cough     No Known Drug Allergy        No results found for this or any previous visit (from the past 24 hour(s)).         Review of Systems:   C: NEGATIVE for fatigue, unexpected change in weight  E: NEGATIVE for acute vision problems or changes  R: NEGATIVE for significant cough or shortness of breath  CV: NEGATIVE for chest pain, palpitations or new or worsening peripheral edema  P: NEGATIVE for changes in mood or affect     Complete ROS negative unless noted above or in HPI       Physical Exam:     Vitals:    06/11/19 1544   BP: 161/90   Pulse: 56   Resp: 20   Temp: 98.2  F (36.8  C)   TempSrc: Oral   SpO2: 100%   Weight: 55.5 kg (122 lb 6.4 oz)   Height: 1.4 m (4' 7.12\")     Body mass index is 28.33 kg/m .    GENERAL APPEARANCE: alert and no acute distress  PSYCH: mentation appears normal and affect normal/bright  RESP: Breathing comfortable on room air  CV: Extremities well perfused  EXT: no cyanosis or edema in lower extremities, ankle is not red or swollen, full ROM, no pain with movement or palpation.   SKIN: no venous stasis changes      " Patient/Caregiver provided printed discharge information.

## 2019-06-18 NOTE — PROGRESS NOTES
Preceptor Attestation:   Patient seen, evaluated and discussed with the resident. I have verified the content of the note, which accurately reflects my assessment of the patient and the plan of care.  Supervising Physician:Carmen Vasquez DO [facility]

## 2019-06-27 ENCOUNTER — TRANSFERRED RECORDS (OUTPATIENT)
Dept: HEALTH INFORMATION MANAGEMENT | Facility: CLINIC | Age: 74
End: 2019-06-27

## 2019-09-18 ENCOUNTER — OFFICE VISIT (OUTPATIENT)
Dept: FAMILY MEDICINE | Facility: CLINIC | Age: 74
End: 2019-09-18
Payer: COMMERCIAL

## 2019-09-18 VITALS
HEART RATE: 76 BPM | TEMPERATURE: 97.8 F | OXYGEN SATURATION: 98 % | HEIGHT: 55 IN | BODY MASS INDEX: 28 KG/M2 | WEIGHT: 121 LBS | DIASTOLIC BLOOD PRESSURE: 71 MMHG | SYSTOLIC BLOOD PRESSURE: 113 MMHG | RESPIRATION RATE: 18 BRPM

## 2019-09-18 DIAGNOSIS — N18.5 CKD (CHRONIC KIDNEY DISEASE) STAGE 5, GFR LESS THAN 15 ML/MIN (H): Primary | ICD-10-CM

## 2019-09-18 DIAGNOSIS — M1A.3710 CHRONIC GOUT OF RIGHT ANKLE DUE TO RENAL IMPAIRMENT WITHOUT TOPHUS: ICD-10-CM

## 2019-09-18 DIAGNOSIS — I10 BENIGN ESSENTIAL HYPERTENSION: ICD-10-CM

## 2019-09-18 PROBLEM — M1A.0710 CHRONIC GOUT OF RIGHT ANKLE: Status: ACTIVE | Noted: 2019-09-18

## 2019-09-18 LAB
BUN SERPL-MCNC: 48 MG/DL (ref 7–30)
CALCIUM SERPL-MCNC: 8.8 MG/DL (ref 8.5–10.4)
CHLORIDE SERPLBLD-SCNC: 108 MMOL/L (ref 94–109)
CO2 SERPL-SCNC: 22 MMOL/L (ref 20–32)
CREAT SERPL-MCNC: 4.4 MG/DL (ref 0.6–1.3)
EGFR CALCULATED (BLACK REFERENCE): 12.5 ML/MIN
EGFR CALCULATED (NON BLACK REFERENCE): 10.4 ML/MIN
GLUCOSE SERPL-MCNC: 142 MG/DL (ref 60–109)
POTASSIUM SERPL-SCNC: 4.9 MMOL/L (ref 3.4–5.3)
SODIUM SERPL-SCNC: 145 MMOL/L (ref 133–144)
URATE SERPL-MCNC: 6.5 MG/DL (ref 2–7.5)

## 2019-09-18 RX ORDER — SODIUM BICARBONATE 650 MG/1
650 TABLET ORAL 4 TIMES DAILY
COMMUNITY
End: 2021-04-30

## 2019-09-18 ASSESSMENT — MIFFLIN-ST. JEOR: SCORE: 882.85

## 2019-09-18 NOTE — PROGRESS NOTES
Assessment and Plan     1. CKD (chronic kidney disease) stage 5, GFR less than 15 ml/min (H)  2. Benign essential hypertension  Most recent [facility] visit was 6/27/19 - notes reviewed, patient again declines kidney biopsy and dialysis. BP at goal today but patient reports elevated at adult day care. Plan to monitor kidney [id number].   BP Readings from Last 3 Encounters:   09/18/19 113/71   06/11/19 161/90   05/15/19 131/85   - Basic Metabolic Panel ([facility]) - Results < 1 hr    3. 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 On allopurinol 50mg daily. Could increase this to 100mg daily if still elevated with close renal and liver monitoring.   - Uric Acid ([facility])    Follow up in 4 weeks if uric acid is still above goal and increase allopurinol, otherwise 3 months for routine check.   Options for treatment and follow-up care were reviewed with the patient and/or guardian. Ralph Marion and/or guardian engaged in the decision making process and verbalized understanding of the options discussed and agreed with the final plan.    Oliver Powell MD   [facility]  Pager #: [phone]    Precepted today with: Cristobal Lopez MD           HPI:       Ralph Marion is a 74 year old female who presents for follow up on multiple issues:    Gout:  She thinks it is still getting better  No longer having pain in ankles  Taking the allopurinol every day, half tab  Swelling is better    BP:   She was seen by [facility] and put back on NaCO3   She thinks this is a BP medication  Checks her BP at adult day care 140-160 systolic at times  No HA or changes to vision           PMHX:     Patient Active Problem List   Diagnosis     Allergies     Arthritis     Chronic pain     Hypercholesterolemia     Secondary localized osteoarthrosis, shoulder region     Recurrent major depression in full remission     Disorder of kidney and ureter     Urinary incontinence      "Vitamin deficiency     Personal care impairment     Osteoporosis     CKD (chronic kidney disease) stage 5, GFR less than 15 ml/min (H)     Benign essential hypertension     Chronic gout of right ankle       Current Outpatient Medications   Medication Sig Dispense Refill     sodium bicarbonate 650 MG tablet Take 650 mg by mouth 4 times daily       allopurinol (ZYLOPRIM) 100 MG tablet Take 0.5 tablets (50 mg) by mouth daily 30 tablet 1       Social History     Tobacco Use     Smoking status: Never Smoker     Smokeless tobacco: Never Used   Substance Use Topics     Alcohol use: No     Alcohol/week: 0.0 oz     Drug use: No          Allergies   Allergen Reactions     Lisinopril Cough     No Known Drug Allergy        No results found for this or any previous visit (from the past 24 hour(s)).         Review of Systems:   C: NEGATIVE for fatigue, unexpected change in weight  E: NEGATIVE for acute vision problems or changes  R: NEGATIVE for significant cough or shortness of breath  CV: NEGATIVE for chest pain, palpitations or new or worsening peripheral edema  P: NEGATIVE for changes in mood or affect     Complete ROS negative unless noted above or in HPI       Physical Exam:     Vitals:    09/18/19 1419   BP: 113/71   Pulse: 76   Resp: 18   Temp: 97.8  F (36.6  C)   TempSrc: Oral   SpO2: 98%   Weight: 54.9 kg (121 lb)   Height: 1.4 m (4' 7.12\")     Body mass index is 28 kg/m .    GENERAL APPEARANCE: alert and no acute distress  PSYCH: mentation appears normal and affect normal/bright  RESP: lungs clear to auscultation - no rales, rhonchi or wheezes  CV: regular rate and rhythm, normal S1 S2, no S3 or S4 and no murmur, click or rub -  EXT: no cyanosis or edema in lower extremities, right ankle a trace swollen but non tender, full ROM, no tophi  SKIN: no venous stasis changes      "

## 2019-09-18 NOTE — NURSING NOTE
Due to patient being non-English speaking/uses sign language, an  was used for this visit. Only for face-to-face interpretation by an external agency, date and length of interpretation can be found on the scanned worksheet.     name: Bhargav Downing  Agency: [employer]  Language: Hmong   Telephone number: [phone]  Type of interpretation: Face-to-face, spoken

## 2019-10-03 ENCOUNTER — DOCUMENTATION ONLY (OUTPATIENT)
Dept: FAMILY MEDICINE | Facility: CLINIC | Age: 74
End: 2019-10-03

## 2019-11-01 ENCOUNTER — MEDICAL CORRESPONDENCE (OUTPATIENT)
Dept: HEALTH INFORMATION MANAGEMENT | Facility: CLINIC | Age: 74
End: 2019-11-01

## 2019-11-04 NOTE — PROGRESS NOTES
Visit to the client's home for annual health risk assessment.  An  was present.    Current situation/living environment  Met with client at her home.  She lives alone in a one bedroom apartment.  She had two grandchildren over visiting at the time. Her home is neat and organized.  Client well groomed.  Client has a son who lives in the area that will step in and assist as needed.  Otherwise, she has formal services in place. Son helps with finances and MA paperwork.     Activities of daily living (ADL)/instrumental activities of daily living (IADL) and functional issues  Client needs help with the following ADL's: grooming and bathing  Client needs help with the following IADL's: shopping, cooking, housekeeping, laundry and transportation  Client states she is unable to perform the above due to left arm pain and poor eye sight.    Client gets monthly panti liners and pull ups.       Health concerns for today    Ralph reports her health concern is her left arm weakness.  She reports no treatment for this.   Reviewed medications and she takes two pills, one for gout and the other for her CKD.  She still declining dialysis.  She says that her body does not feel like she has any problems so no reason for treatment.  No falls or hospitalizations.      Has patient fallen 2 or more times in the last year? No  Has patient fallen with injury in the last year? No    Cognition/mental health    Ralph reports that the [facility] has been good for her because when you are old you can not do things, you are not strong, can not help yourself, and you are stressed.  By going to the [facility] it makes her happy.  Per EMR dx her depression is in remission.  Her DIL who uses to come over daily is no longer able to due stroke.  Now her son has to be with his wife at all times.  [place] reports that she is more lonely now and requesting to go to adult day care another day. Memory in take.  Good historian.     STARS/Med Adherence  Client is  non-compliant with the following quality measures: none  Comments:  Above the age    Client's Plan of Care consists of:  Adult day care (2 days per week), Homemaker services (5 hours per week), Personal care assistance (PCA) (1.25 hours per day) and Specialized supplies and equipment (panti liners and pull ups)

## 2019-12-11 ENCOUNTER — TRANSFERRED RECORDS (OUTPATIENT)
Dept: HEALTH INFORMATION MANAGEMENT | Facility: CLINIC | Age: 74
End: 2019-12-11

## 2020-02-17 ENCOUNTER — MEDICAL CORRESPONDENCE (OUTPATIENT)
Dept: HEALTH INFORMATION MANAGEMENT | Facility: CLINIC | Age: 75
End: 2020-02-17

## 2020-05-17 ENCOUNTER — MEDICAL CORRESPONDENCE (OUTPATIENT)
Dept: HEALTH INFORMATION MANAGEMENT | Facility: CLINIC | Age: 75
End: 2020-05-17

## 2020-05-22 ENCOUNTER — AMBULATORY - HEALTHEAST (OUTPATIENT)
Dept: LAB | Facility: CLINIC | Age: 75
End: 2020-05-22

## 2020-05-22 DIAGNOSIS — N18.5 CKD (CHRONIC KIDNEY DISEASE) STAGE 5, GFR LESS THAN 15 ML/MIN (H): Primary | ICD-10-CM

## 2020-05-26 ENCOUNTER — TRANSFERRED RECORDS (OUTPATIENT)
Dept: HEALTH INFORMATION MANAGEMENT | Facility: CLINIC | Age: 75
End: 2020-05-26

## 2020-05-26 DIAGNOSIS — N18.5 CKD (CHRONIC KIDNEY DISEASE) STAGE 5, GFR LESS THAN 15 ML/MIN (H): ICD-10-CM

## 2020-05-26 LAB
ALBUMIN SERPL BCP-MCNC: 3.7 G/DL (ref 3.5–5)
ANION GAP SERPL CALCULATED.3IONS-SCNC: 10 MMOL/L (ref 5–18)
BUN SERPL-MCNC: 63 MG/DL (ref 8–28)
CALCIUM SERPL-MCNC: 8.5 MG/DL (ref 8.5–10.5)
CHLORIDE SERPL-SCNC: 112 MMOL/L (ref 98–107)
CO2 SERPL-SCNC: 24 MMOL/L (ref 22–31)
CREAT SERPL-MCNC: 4.86 MG/DL (ref 0.6–1.1)
CREAT UR-MCNC: 45.1 MG/DL
GLUCOSE SERPL-MCNC: 94 MG/DL (ref 70–125)
HGB BLD-MCNC: 10.1 G/DL (ref 12–16)
MAGNESIUM SERPL-MCNC: 2.1 MG/DL (ref 1.8–2.6)
PHOSPHATE SERPL-MCNC: 4.1 MG/DL (ref 2.5–4.5)
POTASSIUM SERPL-SCNC: 5 MMOL/L (ref 3.5–5)
PROT UR-MCNC: 108 MG/DL
PROTEIN/CREAT RATIO, RANDOM UR: 2.39
SODIUM SERPL-SCNC: 146 MMOL/L (ref 136–145)

## 2020-05-26 NOTE — PROGRESS NOTES
Orders from Kidney specialist released, performed and to be faxed once resulted to [phone].    [facility] ([facility]) [facility] [address] [place] [address] [phone]

## 2020-05-28 NOTE — RESULT ENCOUNTER NOTE
Labs for [facility] reviewed - creatinine increasing. Mg 2.1 normal. Hgb at baseline 10.1.     To be discussed at follow up visit.   JONNIE

## 2020-10-02 ENCOUNTER — DOCUMENTATION ONLY (OUTPATIENT)
Dept: FAMILY MEDICINE | Facility: CLINIC | Age: 75
End: 2020-10-02

## 2020-10-21 NOTE — PROGRESS NOTES
Telephone call to the client's home for annual health risk assessment due to COVID-19.  An  was present.    Current situation/living environment  Client continues to live alone.  She reaches out to her son when she needs assistance or has questions.  Family assist with finances and MA paperwork. Family and neighbors assist with transportation.    Activities of daily living (ADL)/instrumental activities of daily living (IADL) and functional issues  Client needs help with the following ADL's: grooming and bathing  Client needs help with the following IADL's: shopping, cooking, housekeeping, laundry, managing finances/bills and transportation  Client states she is unable to perform the above due to left arm weakness and poor vision.     Ralph reports that she has not seen a dentist because she is not having any issues with dentures.  Ralph is [place] and does qualify for hearing aid but declines wanting them.  No concerns with vision.    Health concerns for today  Ralph reports no health concerns.  She is feeling healthy.  Reports no pain.  Taking medications as prescribed. No ED/hospitalizations.  Ralph refuses flu shot due to getting ill from when she has had it before.  Has patient fallen 2 or more times in the last year? No  Has patient fallen with injury in the last year? No    Cognition/mental health  [place] reports no cognitive issues.  No mental health issues reported.  She has started going back to [employer]S.  She goes on errands with her neighbors.      STARS/Med Adherence  Client is non-compliant with the following quality measures: aged out      Client's Plan of Care consists of:  Adult day care (2 days per week) Homemaker: 5 hours a week, PCA: 1.15 hours a day. Monthly incontinent products.

## 2021-02-01 ENCOUNTER — TRANSFERRED RECORDS (OUTPATIENT)
Dept: HEALTH INFORMATION MANAGEMENT | Facility: CLINIC | Age: 76
End: 2021-02-01

## 2021-02-09 ENCOUNTER — AMBULATORY - HEALTHEAST (OUTPATIENT)
Dept: NURSING | Facility: CLINIC | Age: 76
End: 2021-02-09

## 2021-03-02 ENCOUNTER — AMBULATORY - HEALTHEAST (OUTPATIENT)
Dept: NURSING | Facility: CLINIC | Age: 76
End: 2021-03-02

## 2021-04-30 ENCOUNTER — ANCILLARY PROCEDURE (OUTPATIENT)
Dept: GENERAL RADIOLOGY | Facility: CLINIC | Age: 76
End: 2021-04-30
Attending: FAMILY MEDICINE
Payer: COMMERCIAL

## 2021-04-30 ENCOUNTER — OFFICE VISIT (OUTPATIENT)
Dept: FAMILY MEDICINE | Facility: CLINIC | Age: 76
End: 2021-04-30
Payer: COMMERCIAL

## 2021-04-30 VITALS
WEIGHT: 115 LBS | DIASTOLIC BLOOD PRESSURE: 88 MMHG | SYSTOLIC BLOOD PRESSURE: 154 MMHG | BODY MASS INDEX: 24.81 KG/M2 | HEIGHT: 57 IN | HEART RATE: 83 BPM | RESPIRATION RATE: 16 BRPM | OXYGEN SATURATION: 99 %

## 2021-04-30 DIAGNOSIS — S42.411G OPEN SUPRACONDYLAR FRACTURE OF RIGHT HUMERUS WITH DELAYED HEALING, SUBSEQUENT ENCOUNTER: ICD-10-CM

## 2021-04-30 DIAGNOSIS — N18.5 CKD (CHRONIC KIDNEY DISEASE) STAGE 5, GFR LESS THAN 15 ML/MIN (H): ICD-10-CM

## 2021-04-30 DIAGNOSIS — S42.411G OPEN SUPRACONDYLAR FRACTURE OF RIGHT HUMERUS WITH DELAYED HEALING, SUBSEQUENT ENCOUNTER: Primary | ICD-10-CM

## 2021-04-30 DIAGNOSIS — M1A.0710 CHRONIC GOUT OF RIGHT ANKLE, UNSPECIFIED CAUSE: ICD-10-CM

## 2021-04-30 DIAGNOSIS — M10.9 GOUT OF LEFT ANKLE, UNSPECIFIED CAUSE, UNSPECIFIED CHRONICITY: ICD-10-CM

## 2021-04-30 PROCEDURE — 73060 X-RAY EXAM OF HUMERUS: CPT | Mod: RT | Performed by: RADIOLOGY

## 2021-04-30 PROCEDURE — 99214 OFFICE O/P EST MOD 30 MIN: CPT | Mod: GC | Performed by: STUDENT IN AN ORGANIZED HEALTH CARE EDUCATION/TRAINING PROGRAM

## 2021-04-30 RX ORDER — ALLOPURINOL 100 MG/1
50 TABLET ORAL DAILY
Qty: 30 TABLET | Refills: 1 | Status: SHIPPED | OUTPATIENT
Start: 2021-04-30 | End: 2023-01-01

## 2021-04-30 RX ORDER — SODIUM BICARBONATE 650 MG/1
650 TABLET ORAL 4 TIMES DAILY
Qty: 120 TABLET | Refills: 4 | Status: SHIPPED | OUTPATIENT
Start: 2021-04-30

## 2021-04-30 ASSESSMENT — MIFFLIN-ST. JEOR: SCORE: 870.64

## 2021-04-30 NOTE — PROGRESS NOTES
Assessment and Plan       Humeral Fracture; Healing:  Suffered fracture of right humerus on 10/2020, and despite surgical fixation/reduction recommended she declined. XR today shows displaced right proximal humerus fracture centered at the proximal humeral metaphysis near humeral neck. Continues to have some pain although minimal, but most significantly has reduced ROM of arm. She doesn't want to do PT either. Explained that she will continue to have decreased ROM and ability to use arm, in comparison to prior to the injury, due to the fracture and not having surgery previously. She and her granddaughter seemed to understand this. OT was offered too but this was declined as well.   - Move as much as possible and stay active   - Minimize sling use and ideally stop using it    Gout:  Hasn't had an episode for awhile, and she does have pills left in her container she brought here today. DIdn't think she took it for awhile but that was actually a different medication.   - Refilled Allopurinol        Options for treatment and follow-up care were reviewed with the patient and/or guardian. Ralph Marion and/or guardian engaged in the decision making process and verbalized understanding of the options discussed and agreed with the final plan.    James Dawson DO, MBA  [facility], PGY-2    Precepted patient with Dr. Sohail Oconnor       HPI:   Ralph Marion is a 76 year old female who presents to clinic today for   Chief Complaint   Patient presents with     Medication Reconciliation     refills allopurinol- out for 2 days now      Shoulder Pain     check shoulder right side- joint area cant lift - previous surgery from impact of fall over year ago     Shoulder Pain:  - Right shoulder  - Since October 2020 she fell and injured shoulder, bone was sticking out. Went to ER, recommended surgery but she didn't want surgery. Had imaging but unclear of results. No PT either  that they recommended.   - Has just been doing activities at home  - unable to lift arm like previously  - Feeling better actually     Gout:  - Needs Allopurinol refilled. Hasn't taken for 2 days.  - Hasn't had gout flare in many years       [employer]  was used for this visit    Denies Fever, Chest Pain, shortness of breath , changes in vision/hearing/GI//diet, abdominal pain, numbness/tingling, or any other concerns.         PMHX:   Active Problems List  Patient Active Problem List   Diagnosis     Allergies     Arthritis     Chronic pain     Hypercholesterolemia     Secondary localized osteoarthrosis, shoulder region     Recurrent major depression in full remission     Disorder of kidney and ureter     Urinary incontinence     Vitamin deficiency     Personal care impairment     Osteoporosis     CKD (chronic kidney disease) stage 5, GFR less than 15 ml/min (H)     Benign essential hypertension     Chronic gout of right ankle       Current Medications  Current Outpatient Medications   Medication Sig Dispense Refill     sodium bicarbonate 650 MG tablet Take 650 mg by mouth 4 times daily       allopurinol (ZYLOPRIM) 100 MG tablet Take 0.5 tablets (50 mg) by mouth daily (Patient not taking: Reported on 4/30/2021) 30 tablet 1       Social History  Social History     Tobacco Use     Smoking status: Never Smoker     Smokeless tobacco: Never Used   Substance Use Topics     Alcohol use: No     Alcohol/week: 0.0 standard drinks     Drug use: No     History   Drug Use No       Family History  Family History   Problem Relation Age of Onset     Cerebrovascular Disease Brother      Hypertension No family hx of      Diabetes No family hx of        Allergies  Allergies   Allergen Reactions     Lisinopril Cough     No Known Drug Allergy             Physical Exam:     Vitals:    04/30/21 0951 04/30/21 1000   BP: (!) 150/89 (!) 154/88   Pulse: 83    Resp: 16    SpO2: 99%    Weight: 52.2 kg (115 lb)    Height: 1.44 m (4'  "8.69\")      Body mass index is 25.16 kg/m .    GENERAL APPEARANCE: alert, appears stated age, no acute distress  RESP: lungs clear to auscultation - no rales, rhonchi, or wheezes  CV: regular rate and rhythm, no murmur, click, rub, or gallop  MSK: Decreased A/PROM right shoulder. Mild TTP anterior shoulder.  SKIN: surgical incision scar right anterior shoulder  NEURO: Normal strength and tone, sensory exam grossly normal, mentation appears intact and speech normal  PSYCH: mood and affect normal/bright       "

## 2021-04-30 NOTE — PROGRESS NOTES
I have personally reviewed the history and examination as documented by Dr. Dawson.  I was present during key portions of the visit and agree with the assessment and plan as documented for 76 yr old female with CKD, gout, shoulder fx here for follow-up. Shoulder xray shows chronic proximal humerus fx w/ callus. Will restart allopurinol. Precautions given. Anticipatory guidance given.     Sohail Oconnor MD  April 30, 2021  10:31 AM

## 2021-04-30 NOTE — NURSING NOTE
Due to patient being non-English speaking/uses sign language, an  was used for this visit. Only for face-to-face interpretation by an external agency, date and length of interpretation can be found on the scanned worksheet.     name: bao booth  Agency: [employer]  Language: Hmong   Telephone number:   Type of interpretation: Telephone, spoken

## 2021-05-31 ENCOUNTER — RECORDS - HEALTHEAST (OUTPATIENT)
Dept: ADMINISTRATIVE | Facility: CLINIC | Age: 76
End: 2021-05-31

## 2021-07-22 ENCOUNTER — RECORDS - HEALTHEAST (OUTPATIENT)
Dept: SCHEDULING | Facility: CLINIC | Age: 76
End: 2021-07-22

## 2021-07-22 DIAGNOSIS — Z12.31 OTHER SCREENING MAMMOGRAM: ICD-10-CM

## 2021-08-03 ENCOUNTER — TRANSFERRED RECORDS (OUTPATIENT)
Dept: HEALTH INFORMATION MANAGEMENT | Facility: CLINIC | Age: 76
End: 2021-08-03

## 2021-10-06 ENCOUNTER — DOCUMENTATION ONLY (OUTPATIENT)
Dept: FAMILY MEDICINE | Facility: CLINIC | Age: 76
End: 2021-10-06

## 2021-11-04 NOTE — PROGRESS NOTES
Telephone call to the client's home for annual health risk assessment due to COVID-19.  An  was present.     Current situation/living environment  Client continues to live alone.  Her son Charito, is the informal caregiver.  His daughter is the PCA/Homemaking.  Client reaches out to her son when she needs assistance or has questions.  Family assist with finances and MA paperwork. Family and neighbors assist with transportation.     Activities of daily living (ADL)/instrumental activities of daily living (IADL) and functional issues  Client needs help with the following ADL's: dressing, grooming and bathing  Client needs help with the following IADL's: shopping, cooking, housekeeping, laundry, managing finances/bills and transportation  Client states she is unable to perform the above due to left arm weakness.  She reports her right arm that she fractured last year is still causing her some concerns.  She reports poor ROM in bilateral arms.      Ralph reports that she has not seen a dentist because she is not having any issues with dentures.  Ralph is [place] and does qualify for hearing aid but declines wanting them.  No concerns with vision.  Gets monthly incontinent products.     Health concerns for today  Ralph reports no medical concerns at this time. She is feeling healthy. She saw her kidney specialist in August.  She reports taking her medications as prescribed.  Reports no pain.  No ED/hospitalizations.  Ralph had COVID-19 shots in Feb and March.  She had her flu shot at . She does need a wellness visit.     Has patient fallen 2 or more times in the last year? No  Has patient fallen with injury in the last year? No     Cognition/mental health  Ralph reports no cognitive issues.  No mental health issues reported.  She has started going back to [employer].  She goes on errands with her neighbors.       STARS/Med Adherence  Client is non-compliant with the following quality measures: aged out        Client's Plan  of Care consists of:  Adult day care (3 days per week) Homemaker: 5 hours a week, PCA: 1.15 hours a day. Monthly incontinent products.

## 2022-01-05 ENCOUNTER — MEDICAL CORRESPONDENCE (OUTPATIENT)
Dept: HEALTH INFORMATION MANAGEMENT | Facility: CLINIC | Age: 77
End: 2022-01-05
Payer: COMMERCIAL

## 2022-02-14 DIAGNOSIS — Z53.9 DIAGNOSIS NOT YET DEFINED: Primary | ICD-10-CM

## 2022-06-02 ENCOUNTER — TRANSFERRED RECORDS (OUTPATIENT)
Dept: HEALTH INFORMATION MANAGEMENT | Facility: CLINIC | Age: 77
End: 2022-06-02
Payer: COMMERCIAL

## 2022-07-07 NOTE — PATIENT INSTRUCTIONS
PERSONAL PREVENTIVE SERVICES PLAN - SERVICES     Review these tests with your medical staff then decide which ones you want and take this page home for your reference      SCREENING TESTS     Description   Year of Last Screening   Recommended Today?   Heart disease screening blood tests    Cholesterol level Reducing cholesterol can reduce your risk of heart attacks by 25%.  Screening is recommended yearly if you are at risk of heart disease otherwise every 4-5 years 9/16/2016 Yes; Recommended    Diabetes screening tests    Hemoglobin A1c blood test   Finding and treating diabetes early can reduce complications.  Screening recommended/covered yearly if you have high blood pressure, high cholesterol, obesity (BMI >30), or a history of high blood glucose tests; or 2 of the following: family history of diabetes, overweight (BMI >25 but <30), age 65 years or older, and a history of diabetes of pregnancy or gave birth to baby weighing more than 9 lbs. 10/13/20  BMP,glucose- 91 No: is not indicated today.   Hepatitis B screening Finding hepatitis B early can reduce complications.  Screening is recommended for persons with selected risk factors.  No: is not indicated today.   Hepatitis C screening Finding hepatitis C early can reduce complications.  Screening is recommended for all persons born from 1945 through 1965 and for those with selected other risk factors.   No: is not indicated today.   HIV screening Finding HIV early can reduce complications.  Screening is recommended for persons with risk factors for HIV infection.  No: is not indicated today.   Glaucoma screening Early detection of glaucoma can prevent blindness.   Please talk to your eye doctor about this.       SCREENING TESTS     Description   Year of Last Screening   Recommended Today?   Colorectal cancer screening    Fecal occult blood test     Screening colonoscopy Screening for colon cancer has been shown to reduce death from colon cancer by 25-30%.  Screening recommended to start at 50 years and continuing until age 75 years.    No: is not indicated today.   Breast Cancer Screening (women)    Mammogram Mammogram screening for breast cancer has been shown to reduce the risk of dying from breast cancer and prolong life. Screening is recommended every 1-2 years for women aged 50 to 74 years.  10/29/14  negative No: is not indicated today.   Cervical Cancer screening (women)    Pap Cervical pap smears can reduce cervical cancer. Screening is recommended annually if high risk (history of abnormal pap smears) otherwise every 2-3 years, stop screening at 65 years of age if history of normal paps.  No: is not indicated today.   Screening for Osteoporosis:  Bone mass measurements (women)    Dexa Scan Screening and treating Osteoporosis can reduce the risk of hip and spine fractures. Screening is recommended in women 65 years or older and in women and men at risk of osteoporosis. 5/16/16  osteoporosis Yes; Recommended    Screening for Lung Cancer     Low-dose CT scanning Screening can reduce mortality in persons aged 55-80 who have smoked at least 30 pack-years and who are either still smoking or have quit in the past 15 years.  No: is not indicated today.   Abdominal Aortic Aneurysm (AAA) screening    Ultrasound (US)   An aneurysm treated before rupture is very safe -a ruptured aneurysm can be fatal.  Screening  by US for AAA is limited to patients who meet one of the following criteria:    Men who are 65-75 years old and have smoked more than 100 cigarettes in their lifetime    Anyone with a family history of abdominal aortic aneurysm  No: is not indicated today.     Here are your recommended immunizations.  Take this home for your reference.                                                    IMMUNIZATIONS Description Recommend today?     Influenza (Flu shot) Prevents flu; should get every year No: is not indicated today.   PCV 13 Pneumonia vaccination; you get it once  Yes; Recommended    PPSV 23 Second pneumonia vaccination; usually get it 1 year after PCV 13 No: is not indicated today.   Zoster (Shingles) Prevents shingles; you get it once  (Check with Part D insurance for coverage, must receive at a pharmacy, not clinic) Yes; Recommended   Tetanus Prevents tetanus; once every 10 years Yes; Recommended      Hepatitis B  If you have any of the following risk factors you should be immunized for hepatitis B: severe kidney disease, people who live in the same house as a carrier of Hepatitis B virus, people who live in  institutions (e.g. nursing homes or group homes), homosexual men, patients with hemophilia who received Factor VIII or IX concentrates, abusers of illicit injectable drugs No: is not indicated today.      PATIENT INSTRUCTIONS    Yearly exam:     See your health care provider every year in order to review changes in your health, review medicines that you take, and discuss preventive care needs such as immunizations and cancer screening.    Get a flu shot each year.     Advance Directives:    If you have not done so, you are encouraged to complete advance directives and/or a living will.   More information about advance directives can be found at: http://www.mnmed.org/advocacy/Key-Issues/Advance-Directives    Nutrition:     Eat at least 5 servings of fruits and vegetables each day.     Eat whole-grain bread, whole-wheat pasta and brown rice instead of white grains and rice.     Talk to your doctor about Calcium and Vitamin D.     Lifestyle:    Exercise for at least 150 minutes a week (30 minutes a day, 5 days a week). This will help you control your weight and prevent disease.     Limit alcohol to one drink per day.     If you smoke, try to quit - your doctor will be happy to help.     Wear sunscreen to prevent skin cancer.     See your dentist every six months for an exam and cleaning.     See your eye doctor every 1 to 2 years to screen for conditions such as  glaucoma, macular degeneration and cataracts.                            At your visit today, we discussed your risk for falls and preventive options.    Fall Prevention  Falls often occur due to slipping, tripping or losing your balance. Millions of people fall every year and injure themselves. Here are ways to reduce your risk of falling again.     Think about your fall, was there anything that caused your fall that can be fixed, removed, or replaced?    Make your home safe by keeping walkways clear of objects you may trip over, such as electric cords.    Use non-slip pads under rugs. Don't use area rugs or small throw rugs.    Use non-slip mats in bathtubs and showers.    Install handrails and lights on staircases. The handrails should be on both sides of the stairs.    Don't walk in poorly lit areas.    Don't stand on chairs or wobbly ladders.    Use caution when reaching overhead or looking upward. This position can cause a loss of balance.    Be sure your shoes fit properly, have non-slip bottoms and are in good condition.     Wear shoes both inside and out. Don't go barefoot or wear slippers.    Be cautious when going up and down stairs, curbs, and when walking on uneven sidewalks.    If your balance is poor, consider using a cane or walker.    If your fall was related to alcohol use, stop or limit alcohol intake.     If your fall was related to use of sleeping medicines, talk to your healthcare provider about this. You may need to reduce your dosage at bedtime if you awaken during the night to go to the bathroom.      To reduce the need for nighttime bathroom trips:  ? Don't drink fluids for several hours before going to bed  ? Empty your bladder before going to bed  ? Men can keep a urinal at the bedside    Stay as active as you can. Balance, flexibility, strength, and endurance all come from exercise. They all play a role in preventing falls. Ask your healthcare provider which types of activity are right  for you.    Get your vision checked on a regular basis.    If you have pets, know where they are before you stand up or walk so you don't trip over them.    Use night lights.    Go over all your medicines with a pharmacist or other healthcare provider to see if any of them could make you more likely to fall.  [employer] last reviewed this educational content on 4/1/2018 2000-2021 [employer]. All rights reserved. This information is not intended as a substitute for professional medical care. Always follow your healthcare professional's instructions.        Patient Education   Personalized Prevention Plan  You are due for the preventive services outlined below.  Your care team is available to assist you in scheduling these services.  If you have already completed any of these items, please share that information with your care team to update in your medical record.  Health Maintenance Due   Topic Date Due     Kidney Microalbumin Urine Test  Never done     Parathyroid Lab  Never done     Depression Action Plan  Never done     Urine Test  Never done     Hepatitis C Screening  Never done     Zoster (Shingles) Vaccine (1 of 2) Never done     Pneumococcal Vaccine (1 - PCV) 01/06/2008     Cholesterol Lab  09/16/2017     Depression Assessment  11/03/2017     Basic Metabolic Panel  12/18/2019     Hemoglobin  11/26/2020     Osteoporosis Screening  05/03/2021       Preventing Falls at Home  A person can fall for many reasons. Older adults may fall because reaction time slows down as we age. Your muscles and joints may get stiff, weak, or less flexible because of illness, medicines, or a physical condition.   Other health problems that make falls more likely include:     Arthritis    Dizziness or lightheadedness when you stand up (orthostatic hypotension)    History of a stroke    Dizziness    Anemia    Certain medicines taken for mental illness or to control blood pressure.    Problems with balance or  gait    Bladder or urinary problems    History of falling    Changes in vision (vision impairment)    Changes in thinking skills and memory (cognitive impairment)  Injuries from a fall can include serious injuries such as broken bones, dislocated joints, internal bleeding and cuts. Injuries like these can limit your independence.   Prevention tips  To help prevent falls and fall-related injuries, follow the tips below.    Floors  To make floors safer:     Put nonskid pads under area rugs.    Remove small rugs.    Replace worn floor coverings.    Tack carpets firmly to each step on carpeted stairs. Put nonskid strips on the edges of uncarpeted stairs.    Keep floors and stairs free of clutter and cords.    Arrange furniture so there are clear pathways.    Clean up any spills right away.  Bathrooms    To make bathrooms safer:     Install grab bars in the tub or shower.    Apply nonskid strips or put a nonskid rubber mat in the tub or shower.    Sit on a bath chair to bathe.    Use bathmats with nonskid backing.  Lighting  To improve visibility in your home:      Keep a flashlight in each room. Or put a lamp next to the bed within easy reach.    Put nightlights in the bedrooms, hallways, kitchen, and bathrooms.    Make sure all stairways have good lighting.    Take your time when going up and down stairs.    Put handrails on both sides of stairs and in walkways for more support. To prevent injury to your wrist or arm, don t use handrails to pull yourself up.    Install grab bars to pull yourself up.    Move or rearrange items that you use often. This will make them easier to find or reach.    Look at your home to find any safety hazards. Especially look at doorways, walkways, and the driveway. Remove or repair any safety problems that you find.  Other changes to make    Look around to find any safety hazards. Look closely at doorways, walkways, and the driveway. Remove or repair any safety problems that you  find.    Wear shoes that fit well.    Take your time when going up and down stairs.    Put handrails on both sides of stairs and in walkways for more support. To prevent injury to your wrist or arm, don t use handrails to pull yourself up.    Install grab bars wherever needed to pull yourself up.    Arrange items that you use often. This will make them easier to find or reach.    [employer] ([employer]) last reviewed this educational content on 3/1/2020    9335-9610 [employer]. All rights reserved. This information is not intended as a substitute for professional medical care. Always follow your healthcare professional's instructions.           Patient Education   Personalized Prevention Plan  You are due for the preventive services outlined below.  Your care team is available to assist you in scheduling these services.  If you have already completed any of these items, please share that information with your care team to update in your medical record.  Health Maintenance Due   Topic Date Due     Kidney Microalbumin Urine Test  Never done     Parathyroid Lab  Never done     Depression Action Plan  Never done     Urine Test  Never done     Hepatitis C Screening  Never done     Zoster (Shingles) Vaccine (1 of 2) Never done     Pneumococcal Vaccine (1 - PCV) 01/06/2008     Cholesterol Lab  09/16/2017     Depression Assessment  11/03/2017     Basic Metabolic Panel  12/18/2019     Hemoglobin  11/26/2020     Osteoporosis Screening  05/03/2021       Preventing Falls at Home  A person can fall for many reasons. Older adults may fall because reaction time slows down as we age. Your muscles and joints may get stiff, weak, or less flexible because of illness, medicines, or a physical condition.   Other health problems that make falls more likely include:     Arthritis    Dizziness or lightheadedness when you stand up (orthostatic hypotension)    History of a stroke    Dizziness    Anemia    Certain medicines taken for mental  illness or to control blood pressure.    Problems with balance or gait    Bladder or urinary problems    History of falling    Changes in vision (vision impairment)    Changes in thinking skills and memory (cognitive impairment)  Injuries from a fall can include serious injuries such as broken bones, dislocated joints, internal bleeding and cuts. Injuries like these can limit your independence.   Prevention tips  To help prevent falls and fall-related injuries, follow the tips below.    Floors  To make floors safer:     Put nonskid pads under area rugs.    Remove small rugs.    Replace worn floor coverings.    Tack carpets firmly to each step on carpeted stairs. Put nonskid strips on the edges of uncarpeted stairs.    Keep floors and stairs free of clutter and cords.    Arrange furniture so there are clear pathways.    Clean up any spills right away.  Bathrooms    To make bathrooms safer:     Install grab bars in the tub or shower.    Apply nonskid strips or put a nonskid rubber mat in the tub or shower.    Sit on a bath chair to bathe.    Use bathmats with nonskid backing.  Lighting  To improve visibility in your home:      Keep a flashlight in each room. Or put a lamp next to the bed within easy reach.    Put nightlights in the bedrooms, hallways, kitchen, and bathrooms.    Make sure all stairways have good lighting.    Take your time when going up and down stairs.    Put handrails on both sides of stairs and in walkways for more support. To prevent injury to your wrist or arm, don t use handrails to pull yourself up.    Install grab bars to pull yourself up.    Move or rearrange items that you use often. This will make them easier to find or reach.    Look at your home to find any safety hazards. Especially look at doorways, walkways, and the driveway. Remove or repair any safety problems that you find.  Other changes to make    Look around to find any safety hazards. Look closely at doorways, walkways, and the  driveway. Remove or repair any safety problems that you find.    Wear shoes that fit well.    Take your time when going up and down stairs.    Put handrails on both sides of stairs and in walkways for more support. To prevent injury to your wrist or arm, don t use handrails to pull yourself up.    Install grab bars wherever needed to pull yourself up.    Arrange items that you use often. This will make them easier to find or reach.    [employer] last reviewed this educational content on 3/1/2020    7226-7241 [employer]. All rights reserved. This information is not intended as a substitute for professional medical care. Always follow your healthcare professional's instructions.

## 2022-07-08 ENCOUNTER — OFFICE VISIT (OUTPATIENT)
Dept: FAMILY MEDICINE | Facility: CLINIC | Age: 77
End: 2022-07-08
Payer: COMMERCIAL

## 2022-07-08 ENCOUNTER — OFFICE VISIT (OUTPATIENT)
Dept: FAMILY MEDICINE | Facility: CLINIC | Age: 77
End: 2022-07-08

## 2022-07-08 VITALS
BODY MASS INDEX: 25.29 KG/M2 | DIASTOLIC BLOOD PRESSURE: 76 MMHG | OXYGEN SATURATION: 99 % | SYSTOLIC BLOOD PRESSURE: 122 MMHG | WEIGHT: 117.25 LBS | TEMPERATURE: 97.6 F | HEART RATE: 78 BPM | RESPIRATION RATE: 18 BRPM | HEIGHT: 57 IN

## 2022-07-08 DIAGNOSIS — Z13.820 SCREENING FOR OSTEOPOROSIS: Primary | ICD-10-CM

## 2022-07-08 DIAGNOSIS — Z00.00 WELLNESS EXAMINATION: Primary | ICD-10-CM

## 2022-07-08 DIAGNOSIS — Z00.00 ENCOUNTER FOR MEDICARE ANNUAL WELLNESS EXAM: ICD-10-CM

## 2022-07-08 DIAGNOSIS — Z00.00 MEDICARE ANNUAL WELLNESS VISIT, SUBSEQUENT: ICD-10-CM

## 2022-07-08 DIAGNOSIS — N18.5 CKD (CHRONIC KIDNEY DISEASE) STAGE 5, GFR LESS THAN 15 ML/MIN (H): ICD-10-CM

## 2022-07-08 LAB — PTH-INTACT SERPL-MCNC: 692 PG/ML (ref 15–65)

## 2022-07-08 PROCEDURE — 99207 PR NO BILLABLE SERVICE THIS VISIT: CPT

## 2022-07-08 PROCEDURE — 99397 PER PM REEVAL EST PAT 65+ YR: CPT | Mod: GC | Performed by: STUDENT IN AN ORGANIZED HEALTH CARE EDUCATION/TRAINING PROGRAM

## 2022-07-08 PROCEDURE — 83970 ASSAY OF PARATHORMONE: CPT | Performed by: STUDENT IN AN ORGANIZED HEALTH CARE EDUCATION/TRAINING PROGRAM

## 2022-07-08 PROCEDURE — 36415 COLL VENOUS BLD VENIPUNCTURE: CPT | Performed by: STUDENT IN AN ORGANIZED HEALTH CARE EDUCATION/TRAINING PROGRAM

## 2022-07-08 NOTE — PROGRESS NOTES
Preceptor Attestation:   Patient seen, evaluated and discussed with the resident Dr. Lu. I have verified the content of the note, which accurately reflects my assessment of the patient and the plan of care.    Recommend DXA scan, will need to review with nephrology for treatment of osteoporosis  Continue routine follow-up with nephrology.    Supervising Physician:Benjamin Rosenstein, MD, MA  [facility]

## 2022-07-08 NOTE — PROGRESS NOTES
Medicare Wellness Visit  Health Risk Assessment           Health Risk Assessment / Review of Systems     Constitutional: Any fevers or night sweats? No     Eyes:  Vision problems    YES- decrease vision    Hearing Do you feel you have hearing loss?  No     Cardiovascular: Any chest pain, fast or irregular heart beat, calf pain with walking?     No           Respiratory:   Any breathing problems or cough?   No     Gastrointestinal: Any stomach or stool problems?   No      Genitourinary: Do you have difficulty controlling urination?   No     Muscles and Joints: Any joint stiffness or soreness?   No     Skin: Any concerning lesions or moles?   No     Nervous System: Any loss of strength or feeling, numbness or tingling, shaking, dizziness, or headache?   YES - right arm/ shoulder, hx open supracondylar fracture of right humerus caused by fall and landing on right side/ shoulder about 2 years ago. Decrease ability to use right arm much since thereafter incident. Granddaughter present during wellness visit, uncertain if patient had physical therapy or rehab after fracture had healed.    Mental Health: Any depression, anxiety or problems sleeping?    No     Cognition: Do you have any problems with your memory?  No            Medical Care     What other specialists or organizations are involved in your medical care?  Kidney specialist  Patient Care Team       Relationship Specialty Notifications Start End    Lashay Santillan MD PCP - General Family Medicine  7/1/22     Phone: [phone] Fax: [phone] [address] [id number]    Teresa [facility] Coordinator  Abnormal results only, Admissions 4/18/13     [facility] :  :  [phone]    Aydin Morales MD Assigned PCP   6/6/21     Phone: [phone] Fax: [phone] [address] [facility] [place] [address]          Have you been to the ER or overnight in the hospital in the last year?   No          Social History / Home Safety       Marital Status:  Who lives in your household? self    Do you feel threatened or controlled by a partner, ex-partner or anyone in your life? No     Has anyone hurt you physically, for example by pushing, hitting, slapping or kicking you   or forcing you to have sex? No          Does your home have any of the following safety concerns; loose rugs in the hallway,  bathrooms with no grab bars by the tub or toilet, stairs with no handrails or poorly lit areas?  No     Do you need help with dressing yourself, bathing, eating or getting around your home?   YES - PCA, grand daughter    Do you need help with the phone, transportation, shopping, preparing meals, housework, laundry, medications or managing money? YES - PCA grand daughter      Risk Behaviors and Healthy Habits     History   Smoking Status     Never Smoker   Smokeless Tobacco     Never Used     How many servings of fruits and vegetables do you eat a day? 1-2 servings a day of fruits/ vegetables. Reviewed and encouraged increase intake of servings when able.    Exercise:  3 times a week walking and stretching. Goes to adult day center 3-4 days a week.     Do you frequently drive without a seatbelt? No     Do you use tobacco?  No     Do you use any other drugs? No         Do you use alcohol?No      Frailty Assessment            Have you lost 10 or more pounds unintentionally in the previous year? No     How difficult is walking from one room to the other on the same level?moderately       Is it difficult to lift or carry something as heavy as 10 pounds?severely      Compared with most (men/women) your age, would you say  that you are more active, less active, or about the same? less        FALL RISK ASSESSMENT 7/8/2022 11/2/2018   Fallen 2 or more times in the past year? No No   Any fall with injury in the past year? No No   Timed Up and Go Test/Seconds (13.5 is a fall risk; contact physician) 19 -          Advance Directives:   Discussed with patient and family as appropriate. Has patient  completed advance directives and/or a living will? yes      Danitza Goodman RN

## 2022-07-08 NOTE — NURSING NOTE
Due to patient being non-English speaking/uses sign language, an  was used for this visit. Only for face-to-face interpretation by an external agency, date and length of interpretation can be found on the scanned worksheet.     name: Robb  Agency: [employer]  Language: Reinaldo   Telephone number:   Type of interpretation: Face-to-face, spoken

## 2022-07-08 NOTE — PROGRESS NOTES
SUBJECTIVE:   Ralph Marion is a 77 year old female who presents for Preventive Visit.      Are you in the first 12 months of your Medicare coverage?  No    HPI  Do you feel safe in your environment? Yes, lives in public housing by herself. She does get help with granddaughter. She does have PCA who comes for 1.25 hours daily.     Have you ever done Advance Care Planning? (For example, a Health Directive, POLST, or a discussion with a medical provider or your loved ones about your wishes): No, advance care planning information given to patient to review.  Patient plans to discuss their wishes with loved ones or provider.       Fall risk  Fallen 2 or more times in the past year?: No  Any fall with injury in the past year?: No  Timed Up and Go Test (>13.5 is fall risk; contact physician) : 19 (uses cane for ambulation)    Cognitive Screening   Unable to complete MiniCog due to non-english speaking. Able to name 14 animals in 1 minute for animal naming test.     Reviewed and updated as needed this visit by clinical staff   Tobacco  Allergies    Med Hx  Surg Hx  Fam Hx            Reviewed and updated as needed this visit by Provider                   Social History     Tobacco Use     Smoking status: Never Smoker     Smokeless tobacco: Never Used   Substance Use Topics     Alcohol use: No     Alcohol/week: 0.0 standard drinks       No flowsheet data found.    Current providers sharing in care for this patient include:   Patient Care Team:  Lashay Santillan MD as PCP - General (Family Medicine)  Teresa Brock as [facility] Coordinator  Aydin Morales MD as Assigned PCP    The following health maintenance items are reviewed in Epic and correct as of today:  Health Maintenance Due   Topic Date Due     MICROALBUMIN  Never done     PARATHYROID  Never done     ADVANCE CARE PLANNING  Never done     DEPRESSION ACTION PLAN  Never done     URINALYSIS  Never done     HEPATITIS C SCREENING  Never done     ZOSTER  IMMUNIZATION (1 of 2) Never done     Pneumococcal Vaccine: 65+ Years (1 - PCV) 01/06/2008     LIPID  09/16/2017     PHQ-9  11/03/2017     MEDICARE ANNUAL WELLNESS VISIT  11/19/2019     BMP  12/18/2019     HEMOGLOBIN  11/26/2020     DEXA  05/03/2021     Lab work is in process  Labs reviewed in EPIC  Patient Active Problem List   Diagnosis     Allergies     Arthritis     Chronic pain     Hypercholesterolemia     Secondary localized osteoarthrosis, shoulder region     Recurrent major depression in full remission     Disorder of kidney and ureter     Urinary incontinence     Vitamin deficiency     Personal care impairment     Osteoporosis     CKD (chronic kidney disease) stage 5, GFR less than 15 ml/min (H)     Benign essential hypertension     Chronic gout of right ankle     Past Surgical History:   Procedure Laterality Date     NO HISTORY OF SURGERY         Social History     Tobacco Use     Smoking status: Never Smoker     Smokeless tobacco: Never Used   Substance Use Topics     Alcohol use: No     Alcohol/week: 0.0 standard drinks     Family History   Problem Relation Age of Onset     Cerebrovascular Disease Brother      Hypertension No family hx of      Diabetes No family hx of      No Known Problems Mother      No Known Problems Father      No Known Problems Sister      No Known Problems Daughter      No Known Problems Maternal Grandmother      No Known Problems Maternal Grandfather      No Known Problems Paternal Grandmother      No Known Problems Paternal Grandfather      No Known Problems Maternal Aunt      No Known Problems Paternal Aunt      Hereditary Breast and Ovarian Cancer Syndrome No family hx of      Breast Cancer No family hx of      Cancer No family hx of      Colon Cancer No family hx of      Endometrial Cancer No family hx of      Ovarian Cancer No family hx of          Current Outpatient Medications   Medication Sig Dispense Refill     allopurinol (ZYLOPRIM) 100 MG tablet Take 0.5 tablets (50 mg)  "by mouth daily 30 tablet 1     sodium bicarbonate 650 MG tablet Take 1 tablet (650 mg) by mouth 4 times daily 120 tablet 4     Pneumonia Vaccine:For adults 65 years or older who do not have an immunocompromising condition, cerebrospinal fluid leak, or cochlear implant and want to receive PPSV23 ONLY: Administer 1 dose of PPSV23. Anyone who received any doses of PPSV23 before age 65 should receive 1 final dose of the vaccine at age 65 or older. Administer this last dose at least 5 years after the prior PPSV23 dose.    Mammogram Screening - Patient over age 75, has elected to discontinue screenings.  Pertinent mammograms are reviewed under the imaging tab.    Review of Systems  Constitutional, HEENT, cardiovascular, pulmonary, gi and gu systems are negative, except as otherwise noted.    OBJECTIVE:   /76   Pulse 78   Temp 97.6  F (36.4  C) (Oral)   Resp 18   Ht 1.448 m (4' 9\")   Wt 53.2 kg (117 lb 4 oz)   SpO2 99%   BMI 25.37 kg/m   Estimated body mass index is 25.37 kg/m  as calculated from the following:    Height as of this encounter: 1.448 m (4' 9\").    Weight as of this encounter: 53.2 kg (117 lb 4 oz).  Physical Exam  GENERAL: healthy, alert and no distress  NECK: no adenopathy, no asymmetry, masses, or scars and thyroid normal to palpation  RESP: lungs clear to auscultation - no rales, rhonchi or wheezes  CV: regular rate and rhythm, normal S1 S2, no S3 or S4, no murmur, click or rub, no peripheral edema and peripheral pulses strong  ABDOMEN: soft, nontender, no hepatosplenomegaly, no masses and bowel sounds normal  MS: no gross musculoskeletal defects noted, no edema    Diagnostic Test Results:  Labs reviewed in Epic    ASSESSMENT / PLAN:       ICD-10-CM    1. Screening for osteoporosis  Z13.820 Dexa hip/pelvis/spine*     Parathyroid Hormone Intact     Parathyroid Hormone Intact   2. Medicare annual wellness visit, subsequent  Z00.00    3. CKD (chronic kidney disease) stage 5, GFR less than 15 " "ml/min (H)  N18.5      Pt should have DEXA scan, as she has had a fall with fracture in the past. She does live at home alone, and should continue with PCA services. She declines home PT for strengthening. She should continue to follow with nephrology given her CKD5. Certainly some concern for frailty. Did not get pneumococcal vaccine today, do recommend getting at pharmacy if able.         COUNSELING:  Reviewed preventive health counseling, as reflected in patient instructions       Regular exercise       Fall risk prevention       Osteoporosis prevention/bone health       Advanced Planning     Estimated body mass index is 25.37 kg/m  as calculated from the following:    Height as of this encounter: 1.448 m (4' 9\").    Weight as of this encounter: 53.2 kg (117 lb 4 oz).    Weight management plan: Discussed healthy diet and exercise guidelines    She reports that she has never smoked. She has never used smokeless tobacco.    Appropriate preventive services were discussed with this patient, including applicable screening as appropriate for cardiovascular disease, diabetes, osteopenia/osteoporosis, and glaucoma.  As appropriate for age/gender, discussed screening for colorectal cancer, prostate cancer, breast cancer, and cervical cancer. Checklist reviewing preventive services available has been given to the patient.    Reviewed patients plan of care and provided an AVS. The Basic Care Plan (routine screening as documented in Health Maintenance) for [place] meets the Care Plan requirement. This Care Plan has been established and reviewed with the Patient and other:granddaughter.    Counseling Resources:  ATP IV Guidelines  Pooled Cohorts Equation Calculator  Breast Cancer Risk Calculator  Breast Cancer: Medication to Reduce Risk  FRAX Risk Assessment  ICSI Preventive Guidelines  Dietary Guidelines for Americans, 2010  USDA's MyPlate  ASA Prophylaxis  Lung CA Screening    Cecilio Lu DO  [facility] " [facility]    Identified Health Risks:    She is at risk for falling and has been provided with information to reduce the risk of falling at home.  She is at risk for falling and has been provided with information to reduce the risk of falling at home.

## 2022-07-08 NOTE — LETTER
July 13, 2022      Ralphjaskaran Marion  [address]  [place] [address]        Dear ,    We are writing to inform you of your test results.    Dear Ralph Sanam,     Thank you for visiting our clinic on Jul 8, 2022.     The result of your recent labwork has returned. Your parathyroid hormone level is elevated. This could be related to your chronic kidney disease, and it does reflect bone health, indicating that your bones might be a little weak. We recommend following through with scheduling your DEXA scan so that we can check your bones.     If you have any questions or concerns, please feel free to give us a call.        Resulted Orders   Parathyroid Hormone Intact   Result Value Ref Range    Parathyroid Hormone Intact 692 (H) 15 - 65 pg/mL    Narrative    This result was obtained with the Roche Elecsys PTH STAT assay.   This reference range differs from PTH assays used in other [facility] laboratories.       If you have any questions or concerns, please call the clinic at the number listed above.       Sincerely,      Benjamin Rosenstein, MD

## 2022-08-29 ENCOUNTER — HOSPITAL ENCOUNTER (OUTPATIENT)
Dept: BONE DENSITY | Facility: HOSPITAL | Age: 77
Discharge: HOME | End: 2022-08-29
Attending: FAMILY MEDICINE | Admitting: FAMILY MEDICINE
Payer: COMMERCIAL

## 2022-08-29 DIAGNOSIS — Z13.820 SCREENING FOR OSTEOPOROSIS: ICD-10-CM

## 2022-08-29 PROCEDURE — 77080 DXA BONE DENSITY AXIAL: CPT | Mod: GA

## 2022-10-04 ENCOUNTER — DOCUMENTATION ONLY (OUTPATIENT)
Dept: FAMILY MEDICINE | Facility: CLINIC | Age: 77
End: 2022-10-04

## 2022-11-01 ENCOUNTER — TRANSFERRED RECORDS (OUTPATIENT)
Dept: HEALTH INFORMATION MANAGEMENT | Facility: CLINIC | Age: 77
End: 2022-11-01

## 2022-11-02 NOTE — PROGRESS NOTES
Telephone call to the client's home for annual health risk assessment.  An  was present.    Completed plan of care will be faxed to provider.    Ralph reports that her arm is still bothering her but feels PT will not be helpful.  She would like to get a vision and hearing test done this next year.  She will have her granddaughter assist with scheduling and transportation.      Services:  PCA increased to 3 hours a day.  Homemaking will stay at 7 hours a week.  She is requesting an additional day of adult day care so will now be going 4 days a week. She still gets monthly incontinent products.     Other  Importance of safe proper disposal of controlled substances discussed with client and resources for med disposal sites provided.      CC reviewed and received expressed/verbal approval by (member or legal rep) of the care plan, including choice of services and providers, choices related to sharing the plan or portions of the plan with others, appeals rights, and data privacy information .

## 2022-11-09 ENCOUNTER — TELEPHONE (OUTPATIENT)
Dept: FAMILY MEDICINE | Facility: CLINIC | Age: 77
End: 2022-11-09

## 2022-11-09 NOTE — TELEPHONE ENCOUNTER
Forms Request  Name of form/paperwork: Medical Report  Do we have the form: No   When is form needed by: N/A    How would you like the form returned: Fax  Fax: [phone]  Patient Notified form requests are processed in 10 business days: yes  Okay to leave a detailed message? yes

## 2022-12-08 ENCOUNTER — TELEPHONE (OUTPATIENT)
Dept: FAMILY MEDICINE | Facility: CLINIC | Age: 77
End: 2022-12-08

## 2022-12-08 NOTE — TELEPHONE ENCOUNTER
Forms Request  Name of form/paperwork: Medical Report for adult   Do we have the form: No   When is form needed by: ASAP   How would you like the form returned: Call Chana Story (adult  staff [phone]) to   Fax: [phone]  Patient Notified form requests are processed in 10 business days: yes  Okay to leave a detailed message? yes

## 2022-12-12 ENCOUNTER — MEDICAL CORRESPONDENCE (OUTPATIENT)
Dept: HEALTH INFORMATION MANAGEMENT | Facility: CLINIC | Age: 77
End: 2022-12-12

## 2022-12-13 ENCOUNTER — TELEPHONE (OUTPATIENT)
Dept: FAMILY MEDICINE | Facility: CLINIC | Age: 77
End: 2022-12-13

## 2022-12-13 NOTE — TELEPHONE ENCOUNTER
Forms completed and faxed to [phone] to Chana roberto adult day care staff a requested. Copies made and placed in .

## 2023-01-01 ENCOUNTER — TRANSFERRED RECORDS (OUTPATIENT)
Dept: HEALTH INFORMATION MANAGEMENT | Facility: CLINIC | Age: 78
End: 2023-01-01
Payer: COMMERCIAL

## 2023-01-01 ENCOUNTER — OFFICE VISIT (OUTPATIENT)
Dept: OPHTHALMOLOGY | Facility: CLINIC | Age: 78
End: 2023-01-01
Attending: FAMILY MEDICINE
Payer: COMMERCIAL

## 2023-01-01 ENCOUNTER — HOSPITAL ENCOUNTER (OUTPATIENT)
Facility: AMBULATORY SURGERY CENTER | Age: 78
End: 2023-01-01
Attending: OPHTHALMOLOGY
Payer: COMMERCIAL

## 2023-01-01 ENCOUNTER — TELEPHONE (OUTPATIENT)
Dept: OPHTHALMOLOGY | Facility: CLINIC | Age: 78
End: 2023-01-01
Payer: COMMERCIAL

## 2023-01-01 ENCOUNTER — OFFICE VISIT (OUTPATIENT)
Dept: FAMILY MEDICINE | Facility: CLINIC | Age: 78
End: 2023-01-01
Payer: COMMERCIAL

## 2023-01-01 ENCOUNTER — OFFICE VISIT (OUTPATIENT)
Dept: OPHTHALMOLOGY | Facility: CLINIC | Age: 78
End: 2023-01-01
Attending: OPHTHALMOLOGY
Payer: COMMERCIAL

## 2023-01-01 ENCOUNTER — TELEPHONE (OUTPATIENT)
Dept: FAMILY MEDICINE | Facility: CLINIC | Age: 78
End: 2023-01-01
Payer: COMMERCIAL

## 2023-01-01 VITALS
BODY MASS INDEX: 19.85 KG/M2 | HEIGHT: 57 IN | DIASTOLIC BLOOD PRESSURE: 66 MMHG | SYSTOLIC BLOOD PRESSURE: 103 MMHG | WEIGHT: 92 LBS | TEMPERATURE: 97.6 F | HEART RATE: 65 BPM | OXYGEN SATURATION: 100 %

## 2023-01-01 VITALS
DIASTOLIC BLOOD PRESSURE: 65 MMHG | WEIGHT: 90 LBS | SYSTOLIC BLOOD PRESSURE: 99 MMHG | HEART RATE: 72 BPM | BODY MASS INDEX: 20.24 KG/M2 | HEIGHT: 56 IN | RESPIRATION RATE: 16 BRPM | OXYGEN SATURATION: 99 % | TEMPERATURE: 97.3 F

## 2023-01-01 DIAGNOSIS — H25.813 MIXED TYPE AGE-RELATED CATARACT, BOTH EYES: Primary | ICD-10-CM

## 2023-01-01 DIAGNOSIS — Z01.01 ENCOUNTER FOR EXAMINATION OF EYES AND VISION WITH ABNORMAL FINDINGS: ICD-10-CM

## 2023-01-01 DIAGNOSIS — Z53.9 DIAGNOSIS NOT YET DEFINED: Primary | ICD-10-CM

## 2023-01-01 DIAGNOSIS — H25.812 COMBINED FORMS OF AGE-RELATED CATARACT OF LEFT EYE: Primary | ICD-10-CM

## 2023-01-01 DIAGNOSIS — H25.811 COMBINED FORMS OF AGE-RELATED CATARACT OF RIGHT EYE: ICD-10-CM

## 2023-01-01 DIAGNOSIS — E46 MALNUTRITION, UNSPECIFIED TYPE (H): Primary | ICD-10-CM

## 2023-01-01 DIAGNOSIS — H53.8 BLURRY VISION, BILATERAL: ICD-10-CM

## 2023-01-01 DIAGNOSIS — R54 FRAIL ELDERLY: ICD-10-CM

## 2023-01-01 DIAGNOSIS — H91.90 HEARING LOSS, UNSPECIFIED HEARING LOSS TYPE, UNSPECIFIED LATERALITY: ICD-10-CM

## 2023-01-01 DIAGNOSIS — H52.4 PRESBYOPIA: ICD-10-CM

## 2023-01-01 DIAGNOSIS — N18.5 CKD (CHRONIC KIDNEY DISEASE) STAGE 5, GFR LESS THAN 15 ML/MIN (H): ICD-10-CM

## 2023-01-01 DIAGNOSIS — R54 FRAIL ELDERLY: Primary | ICD-10-CM

## 2023-01-01 DIAGNOSIS — Z00.00 WELLNESS EXAMINATION: Primary | ICD-10-CM

## 2023-01-01 DIAGNOSIS — F33.42 MAJOR DEPRESSIVE DISORDER, RECURRENT EPISODE, IN FULL REMISSION (H): ICD-10-CM

## 2023-01-01 DIAGNOSIS — Z00.00 ENCOUNTER FOR MEDICARE ANNUAL WELLNESS EXAM: Primary | ICD-10-CM

## 2023-01-01 PROCEDURE — 76512 OPH US DX B-SCAN: CPT | Performed by: OPHTHALMOLOGY

## 2023-01-01 PROCEDURE — 99214 OFFICE O/P EST MOD 30 MIN: CPT | Mod: GC | Performed by: OPHTHALMOLOGY

## 2023-01-01 PROCEDURE — G0439 PPPS, SUBSEQ VISIT: HCPCS | Mod: GC

## 2023-01-01 PROCEDURE — 99213 OFFICE O/P EST LOW 20 MIN: CPT | Mod: GC

## 2023-01-01 PROCEDURE — 92004 COMPRE OPH EXAM NEW PT 1/>: CPT | Performed by: OPHTHALMOLOGY

## 2023-01-01 PROCEDURE — 76519 ECHO EXAM OF EYE: CPT | Performed by: OPHTHALMOLOGY

## 2023-01-01 PROCEDURE — 99213 OFFICE O/P EST LOW 20 MIN: CPT | Mod: 25

## 2023-01-01 PROCEDURE — 99207 PR NO BILLABLE SERVICE THIS VISIT: CPT

## 2023-01-01 PROCEDURE — 92015 DETERMINE REFRACTIVE STATE: CPT | Performed by: OPHTHALMOLOGY

## 2023-01-01 PROCEDURE — 92551 PURE TONE HEARING TEST AIR: CPT

## 2023-01-01 PROCEDURE — G0463 HOSPITAL OUTPT CLINIC VISIT: HCPCS | Performed by: OPHTHALMOLOGY

## 2023-01-01 RX ORDER — LACTOSE-REDUCED FOOD
237 LIQUID (ML) ORAL 2 TIMES DAILY
Qty: 13272 ML | Refills: 3 | Status: SHIPPED | OUTPATIENT
Start: 2023-01-01

## 2023-01-01 RX ORDER — AMLODIPINE BESYLATE 5 MG/1
TABLET ORAL
COMMUNITY
Start: 2023-01-01

## 2023-01-01 ASSESSMENT — PATIENT HEALTH QUESTIONNAIRE - PHQ9: SUM OF ALL RESPONSES TO PHQ QUESTIONS 1-9: 3

## 2023-01-01 ASSESSMENT — EXTERNAL EXAM - LEFT EYE
OS_EXAM: NORMAL
OS_EXAM: NORMAL

## 2023-01-01 ASSESSMENT — CONF VISUAL FIELD
OD_SUPERIOR_NASAL_RESTRICTION: 0
OS_SUPERIOR_NASAL_RESTRICTION: 1
OD_INFERIOR_NASAL_RESTRICTION: 0
OD_SUPERIOR_NASAL_RESTRICTION: 0
OD_NORMAL: 1
OD_SUPERIOR_TEMPORAL_RESTRICTION: 0
OS_INFERIOR_TEMPORAL_RESTRICTION: 1
OD_NORMAL: 1
OD_INFERIOR_NASAL_RESTRICTION: 0
OD_SUPERIOR_TEMPORAL_RESTRICTION: 0
OS_SUPERIOR_NASAL_RESTRICTION: 1
OS_SUPERIOR_TEMPORAL_RESTRICTION: 1
OS_INFERIOR_NASAL_RESTRICTION: 1
OS_INFERIOR_TEMPORAL_RESTRICTION: 1
OS_INFERIOR_NASAL_RESTRICTION: 1
OD_INFERIOR_TEMPORAL_RESTRICTION: 0
OS_SUPERIOR_TEMPORAL_RESTRICTION: 1
OD_INFERIOR_TEMPORAL_RESTRICTION: 0

## 2023-01-01 ASSESSMENT — TONOMETRY
OS_IOP_MMHG: 09
OD_IOP_MMHG: 7
OD_IOP_MMHG: 09
OS_IOP_MMHG: 8
IOP_METHOD: ICARE
IOP_METHOD: ICARE

## 2023-01-01 ASSESSMENT — EXTERNAL EXAM - RIGHT EYE
OD_EXAM: NORMAL
OD_EXAM: NORMAL

## 2023-01-01 ASSESSMENT — VISUAL ACUITY
METHOD: TUMBLING E
OS_SC: LP
METHOD: TUMBLING E'S
OD_PH_SC: 20/50
OS_SC: LP
OD_SC: 20/100
OD_SC: J10
OD_SC: 20/300

## 2023-01-01 ASSESSMENT — CUP TO DISC RATIO
OS_RATIO: NO VIEW
OD_RATIO: 0.2
OD_RATIO: 0.2

## 2023-01-01 ASSESSMENT — REFRACTION_MANIFEST
OD_CYLINDER: SPHERE
OS_SPHERE: BALANCE
OD_ADD: +2.50
OD_SPHERE: -1.75

## 2023-01-01 ASSESSMENT — SLIT LAMP EXAM - LIDS
COMMENTS: 2+ DERMATOCHALASIS

## 2023-07-19 PROBLEM — D63.1 ANEMIA IN CHRONIC KIDNEY DISEASE: Status: ACTIVE | Noted: 2019-12-11

## 2023-07-19 PROBLEM — N25.81 HYPERPARATHYROIDISM DUE TO RENAL INSUFFICIENCY (H): Status: ACTIVE | Noted: 2019-12-11

## 2023-07-19 PROBLEM — N18.9 ANEMIA IN CHRONIC KIDNEY DISEASE: Status: ACTIVE | Noted: 2019-12-11

## 2023-07-19 PROBLEM — E87.20 METABOLIC ACIDOSIS: Status: ACTIVE | Noted: 2019-12-11

## 2023-07-19 NOTE — PROGRESS NOTES
Preceptor Attestation:  Patient's case reviewed and discussed with Lashay Santillan MD resident and I evaluated the patient. I agree with written assessment and plan of care.  Supervising Physician:  NIMO SHIELDS MD  [facility]

## 2023-07-19 NOTE — PROGRESS NOTES
SUBJECTIVE:   Ralph is a 78 year old who presents for Preventive Visit.       No data to display              Are you in the first 12 months of your Medicare coverage?  No    HPI    --Hard of hearing  --Blurry vision- states her left eye she has trouble seeing over a year now-- both far and near  --Tongue is rough- trouble eating; states nothing tastes good; states she has a low appetite; no dysphagia   --States she will occasionally vomit after she eats and feels nauseated-- once in a awhile, but now more frequent recently; no abdominal pain; no diarrhea or hematochezia/melena    --notes she is urinating more frequently; no increased thirst or drinking more often    Have you ever done Advance Care Planning? (For example, a Health Directive, POLST, or a discussion with a medical provider or your loved ones about your wishes): No, advance care planning information given to patient to review.  Patient plans to discuss their wishes with loved ones or provider.  Will bring it back to the clinic after filling it out. May have a copy at home, will bring if this is the case.     Granddaughter comes over daily-- shower, clothing, chores, food, medication  Does not drive a car  Goes to the [facility] for exercise, weights as well     Fall risk  Fallen 2 or more times in the past year?: No  Any fall with injury in the past year?: No  Timed Up and Go Test (>13.5 is fall risk; contact physician) : 18 (uses cane for ambulation)  click delete button to remove this line now  Cognitive Screening   1) Repeat 3 items (Leader, Season, Table)    2) Clock draw: NORMAL  3) 3 item recall: Recalls 2 objects   Results: NORMAL clock, 1-2 items recalled: COGNITIVE IMPAIRMENT LESS LIKELY    Mini-CogTM Copyright REGIS Caal. Licensed by the author for use in [facility]; reprinted with permission (marisol@.[facility]). All rights reserved.      Reviewed and updated as needed this visit by clinical staff   Tobacco  Allergies  Meds   Med  Hx  Surg Hx  Fam Hx          Reviewed and updated as needed this visit by Provider   Tobacco     Med Hx  Surg Hx  Fam Hx         Social History     Tobacco Use     Smoking status: Never     Smokeless tobacco: Never   Substance Use Topics     Alcohol use: No     Alcohol/week: 0.0 standard drinks of alcohol            No data to display            Do you have a current opioid prescription? No  Do you use any other controlled substances or medications that are not prescribed by a provider? None    Current providers sharing in care for this patient include:   Patient Care Team:  Lashay Santillan MD as PCP - General (Family Medicine)  Teresa Brock as [facility] Coordinator  Lashay Santillan MD as Assigned PCP    The following health maintenance items are reviewed in Epic and correct as of today:  Health Maintenance   Topic Date Due     MICROALBUMIN  Never done     DEPRESSION ACTION PLAN  Never done     URINALYSIS  Never done     ZOSTER IMMUNIZATION (1 of 2) Never done     Pneumococcal Vaccine: 65+ Years (2 - PCV) 11/15/2002     LIPID  09/16/2017     BMP  12/18/2019     HEMOGLOBIN  11/26/2020     COVID-19 Vaccine (5 - Pfizer series) 06/21/2022     INFLUENZA VACCINE (1) 09/01/2023     PHQ-9  01/19/2024     MEDICARE ANNUAL WELLNESS VISIT  07/19/2024     FALL RISK ASSESSMENT  07/19/2024     DEXA  08/29/2027     ADVANCE CARE PLANNING  07/19/2028     DTAP/TDAP/TD IMMUNIZATION (7 - Td or Tdap) 10/10/2030     PARATHYROID  Completed     PHOSPHORUS  Completed     ALK PHOS  Completed     IPV IMMUNIZATION  Aged Out     MENINGITIS IMMUNIZATION  Aged Out     CKDV  - 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 Horton does not think she is eligible for hospice at this point  - does have chronic anemia, declines treatment  - does have chronic metabolic acidosis, on bicarb but below goal  -  "does have critically high potassium for which they discussed low potassium diet; patient not interested in further treatment    Chronic anemia  - see above    Chronic metabolic acidosis  - see above    Hypertension  - on amlodipine    Dexa in 2022 did show osteoporosis   Unfortunately, no good treatment given kidney disease w/ BMD; the best way to address this is address her kidney and BMD    Mammogram Screening - Patient over age 75, has elected to discontinue screenings.  Pertinent mammograms are reviewed under the imaging tab.    Review of Systems  Constitutional, HEENT, cardiovascular, pulmonary, GI, , musculoskeletal, neuro, skin, endocrine and psych systems are negative, except as otherwise noted.    OBJECTIVE:   BP 99/65   Pulse 72   Temp 97.3  F (36.3  C) (Tympanic)   Resp 16   Ht 1.426 m (4' 8.14\")   Wt 40.8 kg (90 lb)   SpO2 99%   BMI 20.08 kg/m   Estimated body mass index is 20.08 kg/m  as calculated from the following:    Height as of this encounter: 1.426 m (4' 8.14\").    Weight as of this encounter: 40.8 kg (90 lb).  Physical Exam  GENERAL: alert, no distress and frail  NECK: no adenopathy, no asymmetry, masses, or scars and thyroid normal to palpation  RESP: lungs clear to auscultation - no rales, rhonchi or wheezes  CV: regular rate and rhythm, normal S1 S2, no S3 or S4, no murmur, click or rub, no peripheral edema and peripheral pulses strong  ABDOMEN: soft, nontender, no hepatosplenomegaly, no masses and bowel sounds normal  MS: no gross musculoskeletal defects noted, no edema    ASSESSMENT / PLAN:       ICD-10-CM    1. Encounter for Medicare annual wellness exam  Z00.00       2. Hearing loss, unspecified hearing loss type, unspecified laterality  H91.90 Adult Audiology  Referral     HEARING SCREENING      3. Blurry vision, bilateral  H53.8 Adult Eye  Referral      4. Frail elderly  R54       5. Major depressive disorder, recurrent episode, in full remission (H)  F33.42  "      6. CKD (chronic kidney disease) stage 5, GFR less than 15 ml/min (H)  N18.5         Patient here for preventative visit. She does appear frail, has an abnormal timed up and go test. Discussed physical therapy; however, patient stated that she participates in weight-base activity a few times weekly with friends and declined physical therapy. She does have evidence for osteoporosis on recent DEXA; however, she is not a good candidate for standard treatments given her severe kidney disease w/ BMD. Discussed its important to continue to follow with her nephrologist. It appears she has a semi-comfort approach to her treatment plan as she would not want dialysis, and has refused some treatments per nephrology's most recent note. Patient did say she had an advanced directive at home and will bring it into clinic to scan into the chart. She is unsure what it says at the moment. She also has worsening vision and hearing test (failed), so will refer her appropriately. Discussed mental health (PHQ9 of 3, depression in remission) and low appetite briefly today, but would benefit from an additional appointment to explore this further.       COUNSELING:  Reviewed preventive health counseling, as reflected in patient instructions       Regular exercise       Healthy diet/nutrition       Vision screening       Hearing screening       Fall risk prevention       Osteoporosis prevention/bone health       Advanced Planning         She reports that she has never smoked. She has never used smokeless tobacco.      Appropriate preventive services were discussed with this patient, including applicable screening as appropriate for cardiovascular disease, diabetes, osteopenia/osteoporosis, and glaucoma.  As appropriate for age/gender, discussed screening for colorectal cancer, prostate cancer, breast cancer, and cervical cancer. Checklist reviewing preventive services available has been given to the patient.    Reviewed patients plan of  care and provided an AVS. The Basic Care Plan (routine screening as documented in Health Maintenance) for Ralph meets the Care Plan requirement. This Care Plan has been established and reviewed with the Patient. And family.      Lashay Santillan MD  [facility]    Identified Health Risks:    I have reviewed Opioid Use Disorder and Substance Use Disorder risk factors and made any needed referrals.

## 2023-07-19 NOTE — PROGRESS NOTES
Medicare Wellness Visit  Health Risk Assessment           Health Risk Assessment / Review of Systems     Constitutional: Any fevers or night sweats? No, reports decrease appetite, inability to eat then become nauseous when eat and vomit. Has lost weight.    Eyes:  Vision problems    YES - blurred left eye.     Hearing Do you feel you have hearing loss?   YES - hearing loss in bilateral ears.    Cardiovascular: Any chest pain, fast or irregular heart beat, calf pain with walking?     No           Respiratory:   Any breathing problems or cough?   No     Gastrointestinal: Any stomach or stool problems?   No      Genitourinary: Do you have difficulty controlling urination?   No     Muscles and Joints: Any joint stiffness or soreness?   No     Skin: Any concerning lesions or moles?   No     Nervous System: Any loss of strength or feeling, numbness or tingling, shaking, dizziness, or headache?   YES - generalized weakness.    Mental Health: Any depression, anxiety or problems sleeping?    No     Cognition: Do you have any problems with your memory?   YES - reports some increase in forgetfulness.           Medical Care     What other specialists or organizations are involved in your medical care?  none  Patient Care Team       Relationship Specialty Notifications Start End    Lashay Santillan MD PCP - General Family Medicine  7/1/22     Phone: [phone] Fax: [phone] [address] [facility] [address]    Teresa Brock [facility] Coordinator  Abnormal results only, Admissions 4/18/13     [facility]:  :  [phone]    Lashay Santillan MD Assigned PCP   7/9/22     Phone: [phone] Fax: [phone]         [facility] [facility] [address]          Have you been to the ER or overnight in the hospital in the last year?  No          Social History / Home Safety       Marital Status:  Who lives in your household? self    Do you feel threatened or controlled by a  partner, ex-partner or anyone in your life? No     Has anyone hurt you physically, for example by pushing, hitting, slapping or kicking you   or forcing you to have sex? No          Does your home have any of the following safety concerns; loose rugs in the hallway,  bathrooms with no grab bars by the tub or toilet, stairs with no handrails or poorly lit areas?   YES - no grab bars     Do you need help with dressing yourself, bathing, eating or getting around your home?   YES - PCA    Do you need help with the phone, transportation, shopping, preparing meals, housework, laundry, medications or managing money? YES -PCA      Risk Behaviors and Healthy Habits     History   Smoking Status     Never   Smokeless Tobacco     Never     How many servings of fruits and vegetables do you eat a day? 1-2 servings a day, decrease appetite.    Exercise: 4 days a week goes to [employer], [place], participate in ball tossing, stretches.     Do you frequently drive without a seatbelt? No     Do you use tobacco?  No     Do you use any other drugs? No         Do you use alcohol?No      Frailty Assessment            Have you lost 10 or more pounds unintentionally in the previous year?  YES - no appetite    How difficult is walking from one room to the other on the same level?not       Is it difficult to lift or carry something as heavy as 10 pounds?severely      Compared with most (men/women) your age, would you say  that you are more active, less active, or about the same? same            7/19/2023     2:24 PM 7/8/2022    10:02 AM 11/2/2018     3:00 PM   FALL RISK ASSESSMENT   Fallen 2 or more times in the past year? No No No   Any fall with injury in the past year? No No No   Timed Up and Go Test/Seconds (13.5 is a fall risk; contact physician) 18 19          Advance Directives:   Discussed with patient and family as appropriate. Has patient  completed advance directives and/or a living will? yes      Danitza Goodman RN

## 2023-07-19 NOTE — PATIENT INSTRUCTIONS
PERSONAL PREVENTIVE SERVICES PLAN - SERVICES     Review these tests with your medical staff then decide which ones you want and take this page home for your reference      SCREENING TESTS     Description   Year of Last Screening   Recommended Today?   Heart disease screening blood tests    Cholesterol level Reducing cholesterol can reduce your risk of heart attacks by 25%.  Screening is recommended yearly if you are at risk of heart disease otherwise every 4-5 years 9/16/16 Yes; Recommended    Diabetes screening tests    Hemoglobin A1c blood test   Finding and treating diabetes early can reduce complications.  Screening recommended/covered yearly if you have high blood pressure, high cholesterol, obesity (BMI >30), or a history of high blood glucose tests; or 2 of the following: family history of diabetes, overweight (BMI >25 but <30), age 65 years or older, and a history of diabetes of pregnancy or gave birth to baby weighing more than 9 lbs. 7/29/21  Glucose-  130 No: is not indicated today.   Hepatitis B screening Finding hepatitis B early can reduce complications.  Screening is recommended for persons with selected risk factors.  No: is not indicated today.   Hepatitis C screening Finding hepatitis C early can reduce complications.  Screening is recommended for all persons born from 1945 through 1965 and for those with selected other risk factors.   No: is not indicated today.   HIV screening Finding HIV early can reduce complications.  Screening is recommended for persons with risk factors for HIV infection.  No: is not indicated today.   Glaucoma screening Early detection of glaucoma can prevent blindness.   Please talk to your eye doctor about this.       SCREENING TESTS     Description   Year of Last Screening   Recommended Today?   Colorectal cancer screening    Fecal occult blood test     Screening colonoscopy Screening for colon cancer has been shown to reduce death from colon cancer by 25-30%. Screening  recommended to start at 50 years and continuing until age 75 years.    No: is not indicated today.   Breast Cancer Screening (women)    Mammogram Mammogram screening for breast cancer has been shown to reduce the risk of dying from breast cancer and prolong life. Screening is recommended every 1-2 years for women aged 50 to 74 years.  10/29/14  neg No: is not indicated today.   Cervical Cancer screening (women)    Pap Cervical pap smears can reduce cervical cancer. Screening is recommended annually if high risk (history of abnormal pap smears) otherwise every 2-3 years, stop screening at 65 years of age if history of normal paps.  No: is not indicated today.   Screening for Osteoporosis:  Bone mass measurements (women)    Dexa Scan Screening and treating Osteoporosis can reduce the risk of hip and spine fractures. Screening is recommended in women 65 years or older and in women and men at risk of osteoporosis. 5/3/16  osteoporosis Yes; Recommended    Screening for Lung Cancer     Low-dose CT scanning Screening can reduce mortality in persons aged 55-80 who have smoked at least 30 pack-years and who are either still smoking or have quit in the past 15 years.  No: is not indicated today.   Abdominal Aortic Aneurysm (AAA) screening    Ultrasound (US)   An aneurysm treated before rupture is very safe -a ruptured aneurysm can be fatal.  Screening  by US for AAA is limited to patients who meet one of the following criteria:    Men who are 65-75 years old and have smoked more than 100 cigarettes in their lifetime    Anyone with a family history of abdominal aortic aneurysm  No: is not indicated today.     Here are your recommended immunizations.  Take this home for your reference.                                                    IMMUNIZATIONS Description Recommend today?     Influenza (Flu shot) Prevents flu; should get every year No: is not indicated today.   PCV 13 Pneumonia vaccination; you get it once Yes;  Recommended    PPSV 23 Second pneumonia vaccination; usually get it 1 year after PCV 13 No, not indicated for today   Zoster (Shingles) Prevents shingles; you get it once  (Check with Part D insurance for coverage, must receive at a pharmacy, not clinic) Yes; Recommended    Tetanus Prevents tetanus; once every 10 years Yes; Recommended      Hepatitis B  If you have any of the following risk factors you should be immunized for hepatitis B: severe kidney disease, people who live in the same house as a carrier of Hepatitis B virus, people who live in  institutions (e.g. nursing homes or group homes), homosexual men, patients with hemophilia who received Factor VIII or IX concentrates, abusers of illicit injectable drugs No: is not indicated today.      PATIENT INSTRUCTIONS    Yearly exam:     See your health care provider every year in order to review changes in your health, review medicines that you take, and discuss preventive care needs such as immunizations and cancer screening.    Get a flu shot each year.     Advance Directives:    If you have not done so, you are encouraged to complete advance directives and/or a living will.   More information about advance directives can be found at: http://www.[place]med.org/advocacy/Key-Issues/Advance-Directives    Nutrition:     Eat at least 5 servings of fruits and vegetables each day.     Eat whole-grain bread, whole-wheat pasta and brown rice instead of white grains and rice.     Talk to your doctor about Calcium and Vitamin D.     Lifestyle:    Exercise for at least 150 minutes a week (30 minutes a day, 5 days a week). This will help you control your weight and prevent disease.     Limit alcohol to one drink per day.     If you smoke, try to quit - your doctor will be happy to help.     Wear sunscreen to prevent skin cancer.     See your dentist every six months for an exam and cleaning.     See your eye doctor every 1 to 2 years to screen for conditions such as glaucoma,  macular degeneration and cataracts.                            Patient Education   Personalized Prevention Plan  You are due for the preventive services outlined below.  Your care team is available to assist you in scheduling these services.  If you have already completed any of these items, please share that information with your care team to update in your medical record.  Health Maintenance Due   Topic Date Due     Kidney Microalbumin Urine Test  Never done     Depression Action Plan  Never done     Urine Test  Never done     Zoster (Shingles) Vaccine (1 of 2) Never done     Pneumococcal Vaccine (2 - PCV) 11/15/2002     Cholesterol Lab  09/16/2017     Depression Assessment  11/03/2017     Basic Metabolic Panel  12/18/2019     Hemoglobin  11/26/2020     COVID-19 Vaccine (5 - Pfizer series) 06/21/2022     FALL RISK ASSESSMENT  07/08/2023

## 2023-07-19 NOTE — Clinical Note
Jasson Tapia,  You have an incomplete note. Can you delete/finish so we can sign the encounter?  Thank you! --Lashay

## 2023-08-01 NOTE — PROGRESS NOTES
"  Assessment & Plan     Malnutrition, unspecified type (H)  Related to age-related ANTHONY as well as known uremia and kidney disease. Per last nephrology note, patient declines any future need of dialysis. Was offered hospice, declined. No other symptoms today to warrant further work up including abdominal pain, vomiting, abnormal stools. Does occasionally spit up food, although denies overt dysphagia. Low suspicion for depression, although should monitor closely. Recommended trying high-calorie and protein rich foods, including ensure 1-2x daily. Encouraged to bring advanced directive to clinic so we have goals of care in chart. States it is still at home with son.  - Nutritional Supplements (ENSURE NUTRITION SHAKE) LIQD; Take 1 Bottle by mouth 2 times daily    Return if symptoms worsen or fail to improve.    Lashay Santillan MD  [facility]    Phil Rosas is a 78 year old, presenting for the following health issues:  RECHECK (Stoamch issue, vomiting after each meal. )        8/1/2023     8:46 AM   Additional Questions   Roomed by donita   Accompanied by Granddaughter, chirag watson       HPI     Follow-up low appetite/stomach issues  - today, states she has not had any stomach pains-  - she would \"vomit\" or spit up small amounts of food after she ate around the time of our last visit; however, this has since gone away  - denies problems with swallowing food or liquids  - denies mouth pain/sores  - describes general loss of appetite  - PHQ9 3 last visit; denies depressed mood, sleep troubles; states she likes when family visits (often) denies purposeful isolation  - normal stools, no diarrhea, hematochezia, or melena  - weigh is stable although still reflects a 20+ pound weight loss in the past year    Review of Systems   Constitutional, HEENT, cardiovascular, pulmonary, gi and gu systems are negative, except as otherwise noted.      Objective    /66 (BP Location: Right leg)   " "Pulse 65   Temp 97.6  F (36.4  C) (Oral)   Ht 1.448 m (4' 9\")   Wt 41.7 kg (92 lb)   SpO2 100%   BMI 19.91 kg/m    Body mass index is 19.91 kg/m .  Physical Exam   Gen: Pleasant. No distress. Appears thin.  HEENT: MMM. Temporal wasting present.   Neck: No overt asymmetry.   CV: Appears well-perfused. RRR. No murmur.   Resp: Breathing comfortably on room air. Lungs clear to auscultation bilaterally without wheeze or crackle.   Abd: Non-distended, non-tender.   Ext: No edema or overt asymmetry/deformity.   Skin: No overt rash on easily visualized skin.   Neuro: Non-focal.   Psych: Calm.           "

## 2023-08-02 NOTE — TELEPHONE ENCOUNTER
Attempted to speak with patient via  however patient was not able to hear us. She wants me to speak to her daughter instead. She will be home with her daughter around 3:30pm. I will try to call then about her Eye referral ([phone]) and Audiology referral ([phone]).   Lizbet Puentes, [facility]

## 2023-08-03 NOTE — PROGRESS NOTES
Faculty Supervision of Residents   I have examined this patient and the medical care has been evaluated and discussed with the resident. See resident note outlining our discussion.      Adilene Brian MD

## 2023-09-08 NOTE — TELEPHONE ENCOUNTER
Forms Request  Name of form/paperwork: medical report   Do we have the form: no    When is form needed by: asap    How would you like the form returned: faxed  Fax: [phone]  Patient Notified form requests are processed in 10 business days: yes   Okay to leave a detailed message? yes

## 2023-11-13 NOTE — PROGRESS NOTES
" Current Eye Medications: Uses drop for eye itching. Patient is not sure what its called.      Subjective: Here for complete eye exam. No past eye exams. Vision in left eye is blurry at distance and near. Vision in right eye is good. No eye pain. Sometimes eyes feel itchy.     States vision was previously good in the left eye.  Patient lives on her own in an apartment.  With granddaughter and caretaker Monika; contact son Amy to schedule appointments at [phone].    Objective:  See Ophthalmology Exam.       Assessment:  Mature cataract left eye.      ICD-10-CM    1. Combined forms of age-related cataract, marked, of left eye  H25.812       2. Combined forms of age-related cataract , mild-mod, of right eye  H25.811       3. Blurry vision, bilateral  H53.8 Adult Eye  Referral      4. Encounter for examination of eyes and vision with abnormal findings  Z01.01       5. Presbyopia  H52.4            Plan:  May use artificial tears up to four times a day (like Refresh Optive, Systane Balance, or TheraTears. Avoid \"get the red out\" drops and generic artifical tears).      Referral sent to Dr. Aston Mendoza at the [facility] for a cataract evaluation. Their office will contact you to set up an appointment.    Call in July 2024 for an appointment in November 2024 for Complete Exam    Dr. Rodriguez [phone]       "

## 2023-11-13 NOTE — PATIENT INSTRUCTIONS
"May use artificial tears up to four times a day (like Refresh Optive, Systane Balance, or TheraTears. Avoid \"get the red out\" drops and generic artifical tears).      Referral sent to Dr. Aston Mendoza at the [facility] for a cataract evaluation. Their office will contact you to set up an appointment.    Call in July 2024 for an appointment in November 2024 for Complete Exam    Dr. Rodriguez [phone]    "

## 2023-11-13 NOTE — LETTER
"    11/13/2023         RE: Ralph Marion  [address]  [place] [address]        Dear Colleague,    Thank you for referring your patient, Ralph Marion, to the [facility]. Please see a copy of my visit note below.     Current Eye Medications: Uses drop for eye itching. Patient is not sure what its called.      Subjective: Here for complete eye exam. No past eye exams. Vision in left eye is blurry at distance and near. Vision in right eye is good. No eye pain. Sometimes eyes feel itchy.     States vision was previously good in the left eye.  Patient lives on her own in an apartment.  With granddaughter and caretaker Monika; contact son Amy to schedule appointments at [phone].    Objective:  See Ophthalmology Exam.       Assessment:  Mature cataract left eye.      ICD-10-CM    1. Combined forms of age-related cataract, marked, of left eye  H25.812       2. Combined forms of age-related cataract , mild-mod, of right eye  H25.811       3. Blurry vision, bilateral  H53.8 Adult Eye  Referral      4. Encounter for examination of eyes and vision with abnormal findings  Z01.01       5. Presbyopia  H52.4            Plan:  May use artificial tears up to four times a day (like Refresh Optive, Systane Balance, or TheraTears. Avoid \"get the red out\" drops and generic artifical tears).      Referral sent to Dr. Aston Mendoza at the [facility] for a cataract evaluation. Their office will contact you to set up an appointment.    Call in July 2024 for an appointment in November 2024 for Complete Exam    Dr. Rodriguez [phone]         Again, thank you for allowing me to participate in the care of your patient.        Sincerely,        Wesley Rodriguez MD  "

## 2023-11-20 PROBLEM — H25.811 COMBINED FORMS OF AGE-RELATED CATARACT OF RIGHT EYE: Status: ACTIVE | Noted: 2023-01-01

## 2023-11-20 PROBLEM — H25.812 COMBINED FORMS OF AGE-RELATED CATARACT OF LEFT EYE: Status: ACTIVE | Noted: 2023-01-01

## 2023-12-14 NOTE — NURSING NOTE
Chief Complaints and History of Present Illnesses   Patient presents with    Cataract     Chief Complaint(s) and History of Present Illness(es)       Cataract              Laterality: both eyes    Associated symptoms: blurred vision, glare, haloes and a need for brighter lights    Severity: mild    Onset: gradual    Duration: 1 year    Context: distance vision    Course: gradually worsening    Treatments tried: no treatments    Response to treatment: no improvement              Comments    Patient is bothered by cataracts left eye greater than right eye.  Over a year ago her vision started worsening and now cannot see out of her left eye.  She only wears glasses for reading.  Denies diabetes.      Gris Boudreaux on 12/14/2023 at 9:41 AM

## 2023-12-14 NOTE — PROGRESS NOTES
Chief Complaint(s) and History of Present Illness(es)     Cataract            Laterality: both eyes    Associated symptoms: blurred vision, glare, haloes and a need for   brighter lights    Severity: mild    Onset: gradual    Duration: 1 year    Context: distance vision    Course: gradually worsening    Treatments tried: no treatments    Response to treatment: no improvement          Comments    Patient is bothered by cataracts left eye greater than right eye.  Over a   year ago her vision started worsening and now cannot see out of her left   eye.  She only wears glasses for reading.  Denies diabetes.      Gris Boudreaux on 12/14/2023 at 9:41 AM               Review of systems for the eyes was negative other than the pertinent positives/negatives listed in the HPI.      Assessment & Plan      Ralph Marion is a 78 year old female with the following diagnoses:   1. 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 Rodriguez for cataract eval.  Here with granddaughter who is helping to interpret   Cataract, left >> right eye   Visually significant both eyes   Risks, benefits and alternatives to cataract extraction/IOL implantation discussed; consent obtained.  Will schedule surgery today    Special equipment/needs:    Anesthesia:MAC with block  Dilation:Good  Iris expansion:Not needed  Pseudoexfoliation: No pseudoexfoliation  Trypan Blue: Yes  Likely MSICS left eye.  Start with scleral tunnel  Needle decompression  Wishes to monitor right eye for now           Attending Physician Attestation:  Complete documentation of historical and exam elements from today's encounter can be found in the full encounter summary report (not reduplicated in this progress note).  I personally obtained the chief complaint(s) and history of present illness.  I confirmed and edited as necessary the review of systems, past medical/surgical history, family history, social history, and examination findings as documented by  others; and I examined the patient myself.  I personally reviewed the relevant tests, images, and reports as documented above.  I formulated and edited as necessary the assessment and plan and discussed the findings and management plan with the patient and family. . Today with Ralph Marion  and her granddaughter, I reviewed the indications, risks, benefits, and alternatives of the proposed surgical procedure including, but not limited to, failure obtain the desired result  and need for additional surgery, bleeding, infection, loss of vision, loss of the eye, and the remote possibility of permanent damage to any organ system or death with the use of anesthesia.  I provided multiple opportunities for the questions, answered all questions to the best of my ability, and confirmed that my answers and my discussion were understood.    - Nuno Mendoza MD

## 2023-12-18 NOTE — TELEPHONE ENCOUNTER
Called patient to schedule surgery with Dr. Mendoza     Date of Surgery: 2/19    Location of surgery: [facility]    Pre-Op H&P: PCP    Pre/Post Imaging:  No    Post-Op Appt Date: 3/12    Surgery Packet Mailed: yes    Additional comments: Spoke with patient, they are aware of above dates will review packet and reach out with any questions           Anna C. Schoenecker on 12/18/2023 at 9:40 AM

## 2024-01-31 ENCOUNTER — TELEPHONE (OUTPATIENT)
Dept: FAMILY MEDICINE | Facility: CLINIC | Age: 79
End: 2024-01-31
Payer: COMMERCIAL